# Patient Record
Sex: FEMALE | Race: WHITE | NOT HISPANIC OR LATINO | Employment: UNEMPLOYED | URBAN - METROPOLITAN AREA
[De-identification: names, ages, dates, MRNs, and addresses within clinical notes are randomized per-mention and may not be internally consistent; named-entity substitution may affect disease eponyms.]

---

## 2020-09-18 ENCOUNTER — APPOINTMENT (OUTPATIENT)
Dept: RADIOLOGY | Facility: MEDICAL CENTER | Age: 55
End: 2020-09-18
Attending: EMERGENCY MEDICINE
Payer: OTHER MISCELLANEOUS

## 2020-09-18 ENCOUNTER — HOSPITAL ENCOUNTER (OUTPATIENT)
Facility: MEDICAL CENTER | Age: 55
End: 2020-09-20
Attending: EMERGENCY MEDICINE | Admitting: HOSPITALIST
Payer: OTHER MISCELLANEOUS

## 2020-09-18 DIAGNOSIS — R56.9 SEIZURE-LIKE ACTIVITY (HCC): ICD-10-CM

## 2020-09-18 DIAGNOSIS — R55 SYNCOPE, UNSPECIFIED SYNCOPE TYPE: ICD-10-CM

## 2020-09-18 LAB
ALBUMIN SERPL BCP-MCNC: 4.2 G/DL (ref 3.2–4.9)
ALBUMIN/GLOB SERPL: 1.5 G/DL
ALP SERPL-CCNC: 41 U/L (ref 30–99)
ALT SERPL-CCNC: 31 U/L (ref 2–50)
ANION GAP SERPL CALC-SCNC: 15 MMOL/L (ref 7–16)
APTT PPP: 39.7 SEC (ref 24.7–36)
AST SERPL-CCNC: 31 U/L (ref 12–45)
BASOPHILS # BLD AUTO: 0.3 % (ref 0–1.8)
BASOPHILS # BLD: 0.03 K/UL (ref 0–0.12)
BILIRUB SERPL-MCNC: 0.2 MG/DL (ref 0.1–1.5)
BUN SERPL-MCNC: 14 MG/DL (ref 8–22)
CALCIUM SERPL-MCNC: 9.6 MG/DL (ref 8.5–10.5)
CHLORIDE SERPL-SCNC: 101 MMOL/L (ref 96–112)
CO2 SERPL-SCNC: 22 MMOL/L (ref 20–33)
CREAT SERPL-MCNC: 0.96 MG/DL (ref 0.5–1.4)
EKG IMPRESSION: NORMAL
EOSINOPHIL # BLD AUTO: 0.18 K/UL (ref 0–0.51)
EOSINOPHIL NFR BLD: 1.9 % (ref 0–6.9)
ERYTHROCYTE [DISTWIDTH] IN BLOOD BY AUTOMATED COUNT: 43.2 FL (ref 35.9–50)
GLOBULIN SER CALC-MCNC: 2.8 G/DL (ref 1.9–3.5)
GLUCOSE BLD-MCNC: 93 MG/DL (ref 65–99)
GLUCOSE SERPL-MCNC: 111 MG/DL (ref 65–99)
HCT VFR BLD AUTO: 35.8 % (ref 37–47)
HGB BLD-MCNC: 11.7 G/DL (ref 12–16)
IMM GRANULOCYTES # BLD AUTO: 0.06 K/UL (ref 0–0.11)
IMM GRANULOCYTES NFR BLD AUTO: 0.6 % (ref 0–0.9)
INR PPP: 2.47 (ref 0.87–1.13)
LYMPHOCYTES # BLD AUTO: 1.91 K/UL (ref 1–4.8)
LYMPHOCYTES NFR BLD: 20.3 % (ref 22–41)
MAGNESIUM SERPL-MCNC: 1.7 MG/DL (ref 1.5–2.5)
MCH RBC QN AUTO: 29.8 PG (ref 27–33)
MCHC RBC AUTO-ENTMCNC: 32.7 G/DL (ref 33.6–35)
MCV RBC AUTO: 91.1 FL (ref 81.4–97.8)
MONOCYTES # BLD AUTO: 0.51 K/UL (ref 0–0.85)
MONOCYTES NFR BLD AUTO: 5.4 % (ref 0–13.4)
NEUTROPHILS # BLD AUTO: 6.7 K/UL (ref 2–7.15)
NEUTROPHILS NFR BLD: 71.5 % (ref 44–72)
NRBC # BLD AUTO: 0 K/UL
NRBC BLD-RTO: 0 /100 WBC
PLATELET # BLD AUTO: 184 K/UL (ref 164–446)
PMV BLD AUTO: 11.3 FL (ref 9–12.9)
POTASSIUM SERPL-SCNC: 4.4 MMOL/L (ref 3.6–5.5)
PROT SERPL-MCNC: 7 G/DL (ref 6–8.2)
PROTHROMBIN TIME: 27.5 SEC (ref 12–14.6)
RBC # BLD AUTO: 3.93 M/UL (ref 4.2–5.4)
SODIUM SERPL-SCNC: 138 MMOL/L (ref 135–145)
TROPONIN T SERPL-MCNC: 10 NG/L (ref 6–19)
WBC # BLD AUTO: 9.4 K/UL (ref 4.8–10.8)

## 2020-09-18 PROCEDURE — 93005 ELECTROCARDIOGRAM TRACING: CPT | Performed by: EMERGENCY MEDICINE

## 2020-09-18 PROCEDURE — 85025 COMPLETE CBC W/AUTO DIFF WBC: CPT

## 2020-09-18 PROCEDURE — G0378 HOSPITAL OBSERVATION PER HR: HCPCS

## 2020-09-18 PROCEDURE — 80053 COMPREHEN METABOLIC PANEL: CPT

## 2020-09-18 PROCEDURE — 84484 ASSAY OF TROPONIN QUANT: CPT

## 2020-09-18 PROCEDURE — 85610 PROTHROMBIN TIME: CPT

## 2020-09-18 PROCEDURE — 93005 ELECTROCARDIOGRAM TRACING: CPT

## 2020-09-18 PROCEDURE — 83735 ASSAY OF MAGNESIUM: CPT

## 2020-09-18 PROCEDURE — 700102 HCHG RX REV CODE 250 W/ 637 OVERRIDE(OP): Performed by: STUDENT IN AN ORGANIZED HEALTH CARE EDUCATION/TRAINING PROGRAM

## 2020-09-18 PROCEDURE — 70450 CT HEAD/BRAIN W/O DYE: CPT

## 2020-09-18 PROCEDURE — A9270 NON-COVERED ITEM OR SERVICE: HCPCS | Performed by: STUDENT IN AN ORGANIZED HEALTH CARE EDUCATION/TRAINING PROGRAM

## 2020-09-18 PROCEDURE — 85730 THROMBOPLASTIN TIME PARTIAL: CPT

## 2020-09-18 PROCEDURE — 82962 GLUCOSE BLOOD TEST: CPT

## 2020-09-18 PROCEDURE — 700105 HCHG RX REV CODE 258: Performed by: STUDENT IN AN ORGANIZED HEALTH CARE EDUCATION/TRAINING PROGRAM

## 2020-09-18 PROCEDURE — 99285 EMERGENCY DEPT VISIT HI MDM: CPT

## 2020-09-18 RX ORDER — LISINOPRIL 5 MG/1
5 TABLET ORAL 2 TIMES DAILY
Status: DISCONTINUED | OUTPATIENT
Start: 2020-09-18 | End: 2020-09-20 | Stop reason: HOSPADM

## 2020-09-18 RX ORDER — OMEPRAZOLE 20 MG/1
20 CAPSULE, DELAYED RELEASE ORAL EVERY EVENING
Status: ON HOLD | COMMUNITY
End: 2020-09-20 | Stop reason: SDUPTHER

## 2020-09-18 RX ORDER — WARFARIN SODIUM 3 MG/1
3 TABLET ORAL
Status: DISCONTINUED | OUTPATIENT
Start: 2020-09-18 | End: 2020-09-20

## 2020-09-18 RX ORDER — POLYETHYLENE GLYCOL 3350 17 G/17G
1 POWDER, FOR SOLUTION ORAL
Status: DISCONTINUED | OUTPATIENT
Start: 2020-09-18 | End: 2020-09-20 | Stop reason: HOSPADM

## 2020-09-18 RX ORDER — SIMVASTATIN 40 MG
40 TABLET ORAL NIGHTLY
Status: ON HOLD | COMMUNITY
End: 2020-09-20 | Stop reason: SDUPTHER

## 2020-09-18 RX ORDER — LEVOTHYROXINE SODIUM 0.1 MG/1
200 TABLET ORAL EVERY MORNING
Status: DISCONTINUED | OUTPATIENT
Start: 2020-09-19 | End: 2020-09-20 | Stop reason: HOSPADM

## 2020-09-18 RX ORDER — CARVEDILOL 25 MG/1
50 TABLET ORAL 2 TIMES DAILY
Status: DISCONTINUED | OUTPATIENT
Start: 2020-09-18 | End: 2020-09-19

## 2020-09-18 RX ORDER — LISINOPRIL 5 MG/1
5 TABLET ORAL 2 TIMES DAILY
Status: ON HOLD | COMMUNITY
End: 2020-09-20 | Stop reason: SDUPTHER

## 2020-09-18 RX ORDER — WARFARIN SODIUM 2 MG/1
2 TABLET ORAL
Status: ON HOLD | COMMUNITY
End: 2020-09-20 | Stop reason: SDUPTHER

## 2020-09-18 RX ORDER — WARFARIN SODIUM 3 MG/1
3 TABLET ORAL
Status: ON HOLD | COMMUNITY
End: 2020-09-20 | Stop reason: SDUPTHER

## 2020-09-18 RX ORDER — NORTRIPTYLINE HYDROCHLORIDE 75 MG/1
150 CAPSULE ORAL NIGHTLY
Status: ON HOLD | COMMUNITY
End: 2020-09-20 | Stop reason: SDUPTHER

## 2020-09-18 RX ORDER — BUPROPION HYDROCHLORIDE 100 MG/1
100 TABLET, EXTENDED RELEASE ORAL EVERY MORNING
Status: ON HOLD | COMMUNITY
End: 2020-09-20

## 2020-09-18 RX ORDER — CARVEDILOL 25 MG/1
50 TABLET ORAL 2 TIMES DAILY
Status: ON HOLD | COMMUNITY
End: 2020-09-20

## 2020-09-18 RX ORDER — ACETAMINOPHEN 325 MG/1
650 TABLET ORAL EVERY 6 HOURS PRN
Status: DISCONTINUED | OUTPATIENT
Start: 2020-09-18 | End: 2020-09-20 | Stop reason: HOSPADM

## 2020-09-18 RX ORDER — DEXTROSE MONOHYDRATE 25 G/50ML
50 INJECTION, SOLUTION INTRAVENOUS
Status: DISCONTINUED | OUTPATIENT
Start: 2020-09-18 | End: 2020-09-19

## 2020-09-18 RX ORDER — LABETALOL HYDROCHLORIDE 5 MG/ML
10 INJECTION, SOLUTION INTRAVENOUS EVERY 4 HOURS PRN
Status: DISCONTINUED | OUTPATIENT
Start: 2020-09-18 | End: 2020-09-20 | Stop reason: HOSPADM

## 2020-09-18 RX ORDER — OMEPRAZOLE 20 MG/1
20 CAPSULE, DELAYED RELEASE ORAL EVERY EVENING
Status: DISCONTINUED | OUTPATIENT
Start: 2020-09-18 | End: 2020-09-20 | Stop reason: HOSPADM

## 2020-09-18 RX ORDER — BUSPIRONE HYDROCHLORIDE 10 MG/1
10 TABLET ORAL 3 TIMES DAILY
Status: ON HOLD | COMMUNITY
End: 2020-09-20

## 2020-09-18 RX ORDER — AMOXICILLIN 250 MG
2 CAPSULE ORAL 2 TIMES DAILY
Status: DISCONTINUED | OUTPATIENT
Start: 2020-09-18 | End: 2020-09-20 | Stop reason: HOSPADM

## 2020-09-18 RX ORDER — WARFARIN SODIUM 2 MG/1
2 TABLET ORAL
Status: DISCONTINUED | OUTPATIENT
Start: 2020-09-19 | End: 2020-09-20

## 2020-09-18 RX ORDER — BISACODYL 10 MG
10 SUPPOSITORY, RECTAL RECTAL
Status: DISCONTINUED | OUTPATIENT
Start: 2020-09-18 | End: 2020-09-20 | Stop reason: HOSPADM

## 2020-09-18 RX ORDER — SODIUM CHLORIDE, SODIUM LACTATE, POTASSIUM CHLORIDE, CALCIUM CHLORIDE 600; 310; 30; 20 MG/100ML; MG/100ML; MG/100ML; MG/100ML
INJECTION, SOLUTION INTRAVENOUS CONTINUOUS
Status: DISCONTINUED | OUTPATIENT
Start: 2020-09-18 | End: 2020-09-19

## 2020-09-18 RX ORDER — ARMODAFINIL 150 MG/1
150 TABLET ORAL EVERY EVENING
Status: ON HOLD | COMMUNITY
End: 2020-09-20

## 2020-09-18 RX ORDER — BUSPIRONE HYDROCHLORIDE 10 MG/1
10 TABLET ORAL 3 TIMES DAILY
Status: DISCONTINUED | OUTPATIENT
Start: 2020-09-18 | End: 2020-09-20 | Stop reason: HOSPADM

## 2020-09-18 RX ORDER — SIMVASTATIN 40 MG
40 TABLET ORAL NIGHTLY
Status: DISCONTINUED | OUTPATIENT
Start: 2020-09-18 | End: 2020-09-20 | Stop reason: HOSPADM

## 2020-09-18 RX ORDER — BUPROPION HYDROCHLORIDE 100 MG/1
100 TABLET, EXTENDED RELEASE ORAL EVERY MORNING
Status: DISCONTINUED | OUTPATIENT
Start: 2020-09-19 | End: 2020-09-19

## 2020-09-18 RX ORDER — LEVOTHYROXINE SODIUM 0.1 MG/1
200 TABLET ORAL EVERY MORNING
Status: ON HOLD | COMMUNITY
End: 2020-09-20 | Stop reason: SDUPTHER

## 2020-09-18 RX ADMIN — BUSPIRONE HYDROCHLORIDE 10 MG: 10 TABLET ORAL at 23:08

## 2020-09-18 RX ADMIN — SIMVASTATIN 40 MG: 40 TABLET, FILM COATED ORAL at 23:08

## 2020-09-18 RX ADMIN — CARVEDILOL 50 MG: 25 TABLET, FILM COATED ORAL at 23:08

## 2020-09-18 RX ADMIN — OMEPRAZOLE 20 MG: 20 CAPSULE, DELAYED RELEASE ORAL at 23:08

## 2020-09-18 RX ADMIN — SODIUM CHLORIDE, POTASSIUM CHLORIDE, SODIUM LACTATE AND CALCIUM CHLORIDE: 600; 310; 30; 20 INJECTION, SOLUTION INTRAVENOUS at 23:09

## 2020-09-18 RX ADMIN — LISINOPRIL 5 MG: 5 TABLET ORAL at 23:08

## 2020-09-18 ASSESSMENT — LIFESTYLE VARIABLES
CONSUMPTION TOTAL: NEGATIVE
DOES PATIENT WANT TO STOP DRINKING: NO
HAVE PEOPLE ANNOYED YOU BY CRITICIZING YOUR DRINKING: NO
EVER FELT BAD OR GUILTY ABOUT YOUR DRINKING: NO
ON A TYPICAL DAY WHEN YOU DRINK ALCOHOL HOW MANY DRINKS DO YOU HAVE: 0
TOTAL SCORE: 0
HOW MANY TIMES IN THE PAST YEAR HAVE YOU HAD 5 OR MORE DRINKS IN A DAY: 0
TOTAL SCORE: 0
ALCOHOL_USE: NO
EVER HAD A DRINK FIRST THING IN THE MORNING TO STEADY YOUR NERVES TO GET RID OF A HANGOVER: NO
AVERAGE NUMBER OF DAYS PER WEEK YOU HAVE A DRINK CONTAINING ALCOHOL: 0
HAVE YOU EVER FELT YOU SHOULD CUT DOWN ON YOUR DRINKING: NO
TOTAL SCORE: 0

## 2020-09-18 ASSESSMENT — CHA2DS2 SCORE
CHA2DS2 VASC SCORE: 4
VASCULAR DISEASE: YES
AGE 75 OR GREATER: NO
PRIOR STROKE OR TIA OR THROMBOEMBOLISM: NO
DIABETES: YES
AGE 65 TO 74: NO
CHF OR LEFT VENTRICULAR DYSFUNCTION: NO
SEX: FEMALE
HYPERTENSION: YES

## 2020-09-18 ASSESSMENT — COGNITIVE AND FUNCTIONAL STATUS - GENERAL
MOBILITY SCORE: 24
SUGGESTED CMS G CODE MODIFIER MOBILITY: CH
DAILY ACTIVITIY SCORE: 24
SUGGESTED CMS G CODE MODIFIER DAILY ACTIVITY: CH

## 2020-09-18 ASSESSMENT — FIBROSIS 4 INDEX: FIB4 SCORE: 1.63

## 2020-09-18 ASSESSMENT — PATIENT HEALTH QUESTIONNAIRE - PHQ9
2. FEELING DOWN, DEPRESSED, IRRITABLE, OR HOPELESS: NOT AT ALL
SUM OF ALL RESPONSES TO PHQ9 QUESTIONS 1 AND 2: 0
1. LITTLE INTEREST OR PLEASURE IN DOING THINGS: NOT AT ALL

## 2020-09-19 ENCOUNTER — APPOINTMENT (OUTPATIENT)
Dept: CARDIOLOGY | Facility: MEDICAL CENTER | Age: 55
End: 2020-09-19
Attending: STUDENT IN AN ORGANIZED HEALTH CARE EDUCATION/TRAINING PROGRAM
Payer: OTHER MISCELLANEOUS

## 2020-09-19 PROBLEM — I48.91 ATRIAL FIBRILLATION (HCC): Status: ACTIVE | Noted: 2020-09-19

## 2020-09-19 PROBLEM — I50.9 CHF (CONGESTIVE HEART FAILURE) (HCC): Status: ACTIVE | Noted: 2020-09-19

## 2020-09-19 PROBLEM — E11.9 DM2 (DIABETES MELLITUS, TYPE 2) (HCC): Status: ACTIVE | Noted: 2020-09-19

## 2020-09-19 PROBLEM — G47.33 OSA (OBSTRUCTIVE SLEEP APNEA): Status: ACTIVE | Noted: 2020-09-19

## 2020-09-19 PROBLEM — Z79.899 POLYPHARMACY: Status: ACTIVE | Noted: 2020-09-19

## 2020-09-19 PROBLEM — R40.20 LOSS OF CONSCIOUSNESS (HCC): Status: ACTIVE | Noted: 2020-09-19

## 2020-09-19 PROBLEM — E03.9 HYPOTHYROIDISM: Status: ACTIVE | Noted: 2020-09-19

## 2020-09-19 LAB
ALBUMIN SERPL BCP-MCNC: 4 G/DL (ref 3.2–4.9)
ALBUMIN/GLOB SERPL: 1.5 G/DL
ALP SERPL-CCNC: 40 U/L (ref 30–99)
ALT SERPL-CCNC: 28 U/L (ref 2–50)
AMPHET UR QL SCN: NEGATIVE
ANION GAP SERPL CALC-SCNC: 14 MMOL/L (ref 7–16)
APPEARANCE UR: CLEAR
AST SERPL-CCNC: 31 U/L (ref 12–45)
BARBITURATES UR QL SCN: NEGATIVE
BASOPHILS # BLD AUTO: 0.4 % (ref 0–1.8)
BASOPHILS # BLD: 0.04 K/UL (ref 0–0.12)
BENZODIAZ UR QL SCN: NEGATIVE
BILIRUB SERPL-MCNC: 0.3 MG/DL (ref 0.1–1.5)
BILIRUB UR QL STRIP.AUTO: NEGATIVE
BUN SERPL-MCNC: 13 MG/DL (ref 8–22)
BZE UR QL SCN: NEGATIVE
CALCIUM SERPL-MCNC: 9.3 MG/DL (ref 8.5–10.5)
CANNABINOIDS UR QL SCN: NEGATIVE
CHLORIDE SERPL-SCNC: 102 MMOL/L (ref 96–112)
CK SERPL-CCNC: 121 U/L (ref 0–154)
CO2 SERPL-SCNC: 21 MMOL/L (ref 20–33)
COLOR UR: YELLOW
CREAT SERPL-MCNC: 0.91 MG/DL (ref 0.5–1.4)
EOSINOPHIL # BLD AUTO: 0.18 K/UL (ref 0–0.51)
EOSINOPHIL NFR BLD: 2 % (ref 0–6.9)
ERYTHROCYTE [DISTWIDTH] IN BLOOD BY AUTOMATED COUNT: 44.7 FL (ref 35.9–50)
GLOBULIN SER CALC-MCNC: 2.7 G/DL (ref 1.9–3.5)
GLUCOSE BLD-MCNC: 100 MG/DL (ref 65–99)
GLUCOSE BLD-MCNC: 125 MG/DL (ref 65–99)
GLUCOSE BLD-MCNC: 91 MG/DL (ref 65–99)
GLUCOSE BLD-MCNC: 95 MG/DL (ref 65–99)
GLUCOSE SERPL-MCNC: 196 MG/DL (ref 65–99)
GLUCOSE UR STRIP.AUTO-MCNC: NEGATIVE MG/DL
HCT VFR BLD AUTO: 36.6 % (ref 37–47)
HGB BLD-MCNC: 11.7 G/DL (ref 12–16)
IMM GRANULOCYTES # BLD AUTO: 0.07 K/UL (ref 0–0.11)
IMM GRANULOCYTES NFR BLD AUTO: 0.8 % (ref 0–0.9)
KETONES UR STRIP.AUTO-MCNC: NEGATIVE MG/DL
LEUKOCYTE ESTERASE UR QL STRIP.AUTO: NEGATIVE
LV EJECT FRACT  99904: 55
LYMPHOCYTES # BLD AUTO: 2.69 K/UL (ref 1–4.8)
LYMPHOCYTES NFR BLD: 30 % (ref 22–41)
MCH RBC QN AUTO: 29.8 PG (ref 27–33)
MCHC RBC AUTO-ENTMCNC: 32 G/DL (ref 33.6–35)
MCV RBC AUTO: 93.1 FL (ref 81.4–97.8)
METHADONE UR QL SCN: NEGATIVE
MICRO URNS: NORMAL
MONOCYTES # BLD AUTO: 0.3 K/UL (ref 0–0.85)
MONOCYTES NFR BLD AUTO: 3.3 % (ref 0–13.4)
NEUTROPHILS # BLD AUTO: 5.68 K/UL (ref 2–7.15)
NEUTROPHILS NFR BLD: 63.5 % (ref 44–72)
NITRITE UR QL STRIP.AUTO: NEGATIVE
NRBC # BLD AUTO: 0 K/UL
NRBC BLD-RTO: 0 /100 WBC
OPIATES UR QL SCN: NEGATIVE
OXYCODONE UR QL SCN: NEGATIVE
PCP UR QL SCN: NEGATIVE
PH UR STRIP.AUTO: 7 [PH] (ref 5–8)
PLATELET # BLD AUTO: 193 K/UL (ref 164–446)
PMV BLD AUTO: 11.9 FL (ref 9–12.9)
POTASSIUM SERPL-SCNC: 3.5 MMOL/L (ref 3.6–5.5)
PROPOXYPH UR QL SCN: NEGATIVE
PROT SERPL-MCNC: 6.7 G/DL (ref 6–8.2)
PROT UR QL STRIP: NEGATIVE MG/DL
RBC # BLD AUTO: 3.93 M/UL (ref 4.2–5.4)
RBC UR QL AUTO: NEGATIVE
SODIUM SERPL-SCNC: 137 MMOL/L (ref 135–145)
SP GR UR STRIP.AUTO: 1.01
TROPONIN T SERPL-MCNC: 9 NG/L (ref 6–19)
TSH SERPL DL<=0.005 MIU/L-ACNC: 2.35 UIU/ML (ref 0.38–5.33)
UROBILINOGEN UR STRIP.AUTO-MCNC: 1 MG/DL
VIT B12 SERPL-MCNC: 1289 PG/ML (ref 211–911)
WBC # BLD AUTO: 9 K/UL (ref 4.8–10.8)

## 2020-09-19 PROCEDURE — 82607 VITAMIN B-12: CPT

## 2020-09-19 PROCEDURE — 700102 HCHG RX REV CODE 250 W/ 637 OVERRIDE(OP): Performed by: STUDENT IN AN ORGANIZED HEALTH CARE EDUCATION/TRAINING PROGRAM

## 2020-09-19 PROCEDURE — 80307 DRUG TEST PRSMV CHEM ANLYZR: CPT

## 2020-09-19 PROCEDURE — 94660 CPAP INITIATION&MGMT: CPT

## 2020-09-19 PROCEDURE — 81003 URINALYSIS AUTO W/O SCOPE: CPT

## 2020-09-19 PROCEDURE — 84484 ASSAY OF TROPONIN QUANT: CPT

## 2020-09-19 PROCEDURE — 36415 COLL VENOUS BLD VENIPUNCTURE: CPT

## 2020-09-19 PROCEDURE — 93306 TTE W/DOPPLER COMPLETE: CPT

## 2020-09-19 PROCEDURE — 85025 COMPLETE CBC W/AUTO DIFF WBC: CPT

## 2020-09-19 PROCEDURE — A9270 NON-COVERED ITEM OR SERVICE: HCPCS | Performed by: STUDENT IN AN ORGANIZED HEALTH CARE EDUCATION/TRAINING PROGRAM

## 2020-09-19 PROCEDURE — 99220 PR INITIAL OBSERVATION CARE,LEVL III: CPT | Mod: GC | Performed by: HOSPITALIST

## 2020-09-19 PROCEDURE — 84443 ASSAY THYROID STIM HORMONE: CPT

## 2020-09-19 PROCEDURE — 93306 TTE W/DOPPLER COMPLETE: CPT | Mod: 26 | Performed by: INTERNAL MEDICINE

## 2020-09-19 PROCEDURE — 82550 ASSAY OF CK (CPK): CPT

## 2020-09-19 PROCEDURE — G0378 HOSPITAL OBSERVATION PER HR: HCPCS

## 2020-09-19 PROCEDURE — 82962 GLUCOSE BLOOD TEST: CPT | Mod: 91

## 2020-09-19 PROCEDURE — 80053 COMPREHEN METABOLIC PANEL: CPT

## 2020-09-19 RX ORDER — SUMATRIPTAN 50 MG/1
100 TABLET, FILM COATED ORAL
Status: ON HOLD | COMMUNITY
End: 2020-09-20

## 2020-09-19 RX ORDER — POTASSIUM CHLORIDE 20 MEQ/1
40 TABLET, EXTENDED RELEASE ORAL ONCE
Status: COMPLETED | OUTPATIENT
Start: 2020-09-19 | End: 2020-09-19

## 2020-09-19 RX ORDER — BUTALBITAL, ACETAMINOPHEN AND CAFFEINE 50; 325; 40 MG/1; MG/1; MG/1
1-2 TABLET ORAL EVERY 4 HOURS PRN
Status: ON HOLD | COMMUNITY
End: 2020-09-20

## 2020-09-19 RX ORDER — OXYCODONE HYDROCHLORIDE 5 MG/1
5 TABLET ORAL EVERY 6 HOURS PRN
Status: ON HOLD | COMMUNITY
End: 2020-09-20

## 2020-09-19 RX ORDER — CARVEDILOL 25 MG/1
25 TABLET ORAL 2 TIMES DAILY
Status: DISCONTINUED | OUTPATIENT
Start: 2020-09-19 | End: 2020-09-20 | Stop reason: HOSPADM

## 2020-09-19 RX ORDER — HYDROCODONE BITARTRATE AND ACETAMINOPHEN 5; 325 MG/1; MG/1
1 TABLET ORAL EVERY 6 HOURS PRN
Status: ON HOLD | COMMUNITY
End: 2020-09-20

## 2020-09-19 RX ORDER — ONDANSETRON 8 MG/1
8 TABLET, ORALLY DISINTEGRATING ORAL EVERY 8 HOURS PRN
Status: ON HOLD | COMMUNITY
End: 2020-09-20

## 2020-09-19 RX ADMIN — CARVEDILOL 25 MG: 25 TABLET, FILM COATED ORAL at 18:15

## 2020-09-19 RX ADMIN — LISINOPRIL 5 MG: 5 TABLET ORAL at 04:44

## 2020-09-19 RX ADMIN — OMEPRAZOLE 20 MG: 20 CAPSULE, DELAYED RELEASE ORAL at 18:15

## 2020-09-19 RX ADMIN — LISINOPRIL 5 MG: 5 TABLET ORAL at 18:15

## 2020-09-19 RX ADMIN — CARVEDILOL 50 MG: 25 TABLET, FILM COATED ORAL at 04:43

## 2020-09-19 RX ADMIN — SIMVASTATIN 40 MG: 40 TABLET, FILM COATED ORAL at 20:07

## 2020-09-19 RX ADMIN — BUSPIRONE HYDROCHLORIDE 10 MG: 10 TABLET ORAL at 12:43

## 2020-09-19 RX ADMIN — POTASSIUM CHLORIDE 40 MEQ: 1500 TABLET, EXTENDED RELEASE ORAL at 08:13

## 2020-09-19 RX ADMIN — BUSPIRONE HYDROCHLORIDE 10 MG: 10 TABLET ORAL at 18:15

## 2020-09-19 RX ADMIN — WARFARIN SODIUM 3 MG: 3 TABLET ORAL at 00:33

## 2020-09-19 RX ADMIN — WARFARIN SODIUM 2 MG: 2 TABLET ORAL at 18:16

## 2020-09-19 RX ADMIN — LEVOTHYROXINE SODIUM 200 MCG: 0.1 TABLET ORAL at 04:44

## 2020-09-19 RX ADMIN — SERTRALINE HYDROCHLORIDE 50 MG: 50 TABLET ORAL at 04:44

## 2020-09-19 RX ADMIN — BUSPIRONE HYDROCHLORIDE 10 MG: 10 TABLET ORAL at 04:44

## 2020-09-19 ASSESSMENT — ENCOUNTER SYMPTOMS
MYALGIAS: 0
COUGH: 0
TREMORS: 1
DIAPHORESIS: 0
NAUSEA: 1
FEVER: 0
WEAKNESS: 0
ABDOMINAL PAIN: 0
DOUBLE VISION: 0
VOMITING: 0
TINGLING: 0
SORE THROAT: 0
CHILLS: 0
SHORTNESS OF BREATH: 0
NERVOUS/ANXIOUS: 1
DIARRHEA: 0
FOCAL WEAKNESS: 0
HEADACHES: 1
DEPRESSION: 0
CONSTIPATION: 0
PALPITATIONS: 0
BLOOD IN STOOL: 0
BLURRED VISION: 0

## 2020-09-19 ASSESSMENT — COPD QUESTIONNAIRES
COPD SCREENING SCORE: 1
DO YOU EVER COUGH UP ANY MUCUS OR PHLEGM?: NO/ONLY WITH OCCASIONAL COLDS OR INFECTIONS
DURING THE PAST 4 WEEKS HOW MUCH DID YOU FEEL SHORT OF BREATH: NONE/LITTLE OF THE TIME
HAVE YOU SMOKED AT LEAST 100 CIGARETTES IN YOUR ENTIRE LIFE: NO/DON'T KNOW

## 2020-09-19 ASSESSMENT — FIBROSIS 4 INDEX: FIB4 SCORE: 1.64

## 2020-09-19 NOTE — ASSESSMENT & PLAN NOTE
Reports history of YANIRA on CPAP with nightly compliance. Has equipment at hotel, but not at hospital.  -CPAP  -RT consult for CPAP management

## 2020-09-19 NOTE — PROGRESS NOTES
54yo female with syncope possible seizure. Hx of afib on warfarin.  Patient will be admitted by [facility] internal medicine discussed with Dr. Solano

## 2020-09-19 NOTE — ASSESSMENT & PLAN NOTE
PAF  -Chadsvasc 3    Plan:  -Coreg decreased to 25 mg BID, usual max dosing  -Warfarin, pharmacy dosing

## 2020-09-19 NOTE — PROGRESS NOTES
Assumed care of patient from ARIELLE Hernandez. Handoff report received and understood. Patient transported via Zoll monitor with ASHLEY RN to 807-1. Patient in bed, alert and oriented x4. Bed is low and locked. Call light within reach. No further needs at this time.

## 2020-09-19 NOTE — SENIOR ADMIT NOTE
Senior Admit Note    Patient: Elise Rodriguez.  MRN: [id number].                               Chief complaint: syncope. ?Seizure    Assessment and Plan:  This is a 55 yr old female brought to ED by EMS after what seems to be a seizure like activity (tonic clonic seizures) and was in post ictal confusion. But, according to patient and her  bedside who has a second hand story, patient was sitting at a slot machine table, lost consciousness and fell flat on table, no rhythmic jerks or tongue bite or loss of bladder or bowel control but mentioned confusion after the event but also mentioned that she has some short term memory loss after her fall 7 yrs ago when he had a fall and hit her head. The history is inconclusive whether it is true seizure episode or syncope.  Patients medical history is also significant for CHF, Afib, sleep apnea on CPAP, Sarcoidosis, [id number].  Pt receives care at [facility] in [place], [place] and is visiting [place].    #Syncope  #? Seizure  #CHF  #[id number]  #Afib (likely paroxysmal)  #Memory issues  #Sleep apnea on CPAP    -Based on presentation this likely could be a syncope vs ? Seizure, patient has a h/o of CHF (not clear if it is HFrEF or Diastolic heart failure), and sleep apnea could be contributing to her Diastolic heart failure and causing her syncope, patient doesn't have any past history of seizures, no electrolyte abnormalities, however mentioned she had a fall 7 yr ago and hit her head (doesn't remember exact events) could be a focii for seizure.    -Orthostatic vitals pending  -IV fluids, CPK pending  -DC Bupropion as it has the potential to lower seizure threshold  -Echo pending to assess cardiac function  -No conduction/heart blocks appreciated on EKG, patient in sinus rhythm now, troponin- wnl,  -continue carvedilol, lisinopril, warfarin.  -Telemetry monitoring  -If any further episodes of Seizure, we will consider loading with Keppra and encourage day team to consult  Neurology for potential EEG.    DVT prophylaxis: Lovenox  Code status: Full code    For complete details, please refer to H&P by Dr. Plasencia.    Elier Solano MD  Resident Physician, PGY2  Internal Medicine, [employer].

## 2020-09-19 NOTE — ED NOTES
"Pt brought in by KATH[facility] from [facility], per EMS pt had witness \"tonic clonic\" seizure that lasted for approx. 30 seconds to 1 minute. No hx of seizures. EMS states on arrival to scene pt sitting up in chair, post ictal. States during transport pt became more alert and answering questions appropriately.     . On arrival to room pt alert/oriented x 4, knows year but confused to what month it is. Pt denies any pain.    Pt admits to two alcoholic drinks today, states drinking very rare.   "

## 2020-09-19 NOTE — ASSESSMENT & PLAN NOTE
History of hypothyroidism, unknown etiology, as reported by patient. Ordinarily on synthroid 200mcg at home.  -Resume synthroid 200mcg

## 2020-09-19 NOTE — ASSESSMENT & PLAN NOTE
Acute LOC 30s-1min with post-ictal confusion  -hx A fib, HFrEF, polypharmacy but no seizures or prior syncope  -2/2 syncope most likely cardiogenic from arrhythmia in setting of polypharmacy difficult to exclude neurogenic/seizure given poor witness accounts  -Echo without structural abnormality    Plan:  -telemetry  -holding buproprion (lowers seizure threshold)  -if seizure workup indicated, then will leave instructions for followup in [place] and suspend license

## 2020-09-19 NOTE — PROGRESS NOTES
Inpatient Anticoagulation Service Note    Date: 9/18/2020    Reason for Anticoagulation: Atrial Fibrillation   Target INR: 2.0 to 3.0  [id number] VASc Score: 4  HAS-BLED Score: 0   Hemoglobin Value: (!) 11.7  Hematocrit Value: (!) 35.8  Lab Platelet Value: 184    INR from last 7 days     Date/Time INR Value    09/18/20 1852  (!) 2.47        Dose from last 7 days     None        Bridge Therapy: No     Reversal Agent Administered: Not Applicable    Comments: Contiue home warfarin for a-fib, home dosing regimen is 3 mg on M, W, F and 2 mg AOD. INR on admit is therapeutic at 2.47. Will continue with home dosing regimen and order INR x3 days.    Plan:  Continue home dosing regimen of 3 mg on M,W,F and 2 mg AOD.    Education Material Provided?: No (chronic warfarin patient)    Pharmacist suggested discharge dosing: Resume home dosing regimen of 3 mg on M,W,F and 2 mg AOD with close follow-up with anticoagulation clinic within 72 hours of discharge.     Mayi Calderon, PharmD

## 2020-09-19 NOTE — H&P
"History & Physical Note    Date of Admission: 9/18/2020  Admission Status: Observation-Outpatient  [facility] Team: [facility] IM Red Team   Attending: Amira Jones MD   Senior Resident: Dr. Wisam Calvin DO  Contact Number: [phone]    Chief Complaint: \"they told me I slumped over.\"    History of Present Illness (HPI):   Elise is a 55 y.o. female with hx including atrial fibrillation, CHF, DM2 who presented 9/18/2020 with sudden onset LoC/confusion. This afternoon, she was in casino at a machine and suddenly lost consciousness. Per bystander, patient slumped back into chair, then leaned forward falling onto machine, and then rolled to the floor. Spouse, nearby, did not witness event, and was unsure of rhythmic movements; per EMS note, possible tonic-clonic seizure lasting 30s-1min.  No incontinence, but post episodic confusion, tremulousness, and anterograde amnesia/short term memory difficulty. Prior to event, patient does report having 1/2 beer but stopped due to queasiness/disliking. Has been having worsening difficulty with memory, which has been present for long time.    Originally from [place], [place]. Gets care at [facility]. Reports history of Atrial fibrillation, CHF which were diagnosed simultaneously approximately 6-7 years ago, were unsure of her EF %.    Review of Systems:   Review of Systems   Constitutional: Negative for chills, diaphoresis, fever and malaise/fatigue.   HENT: Positive for tinnitus. Negative for congestion, hearing loss, nosebleeds and sore throat.    Eyes: Negative for blurred vision and double vision.   Respiratory: Negative for cough and shortness of breath.    Cardiovascular: Negative for chest pain, palpitations and leg swelling.   Gastrointestinal: Positive for nausea. Negative for abdominal pain, blood in stool, constipation, diarrhea and vomiting.   Genitourinary: Negative for dysuria, frequency, hematuria and urgency.   Musculoskeletal: Negative for joint pain and myalgias.   Skin: " Negative for itching and rash.   Neurological: Positive for tremors and headaches. Negative for tingling, focal weakness and weakness.   Psychiatric/Behavioral: Negative for depression. The patient is nervous/anxious.          Past Medical History:   Past Medical History was reviewed with patient.  Additional information: Atrial fibrillation, CHF, migraine HA, YANIRA compliant with CPAP, sarcoidosis (remission), neuropathy.   has a past medical history of Diabetes (HCC), High cholesterol, and Hypertension.    Past Surgical History: Past Surgical History was reviewed with patient.  Additional information: cholecystectomy   has no past surgical history on file.    Medications: Medications have been reviewed with patient.  Prior to Admission Medications   Prescriptions Last Dose Informant Patient Reported? Taking?   Armodafinil 150 MG Tab 9/17/2020 at pm Rx Bottle (For Med Information) Yes Yes   Sig: Take 150 mg by mouth every evening.   CALCIUM PO 9/18/2020 at 0830 Rx Bottle (For Med Information) Yes Yes   Sig: Take 1 Tab by mouth 2 Times a Day.   MAGNESIUM PO 9/18/2020 at 0830 Rx Bottle (For Med Information) Yes Yes   Sig: Take 1 Tab by mouth 2 Times a Day.   buPROPion SR (WELLBUTRIN-SR) 100 MG TABLET SR 12 HR 9/18/2020 at 0830 Rx Bottle (For Med Information) Yes Yes   Sig: Take 100 mg by mouth every morning.   busPIRone (BUSPAR) 10 MG Tab tablet 9/18/2020 at afternoon Rx Bottle (For Med Information) Yes Yes   Sig: Take 10 mg by mouth 3 times a day.   carvedilol (COREG) 25 MG Tab 9/18/2020 at 0830 Rx Bottle (For Med Information) Yes Yes   Sig: Take 50 mg by mouth 2 Times a Day.   levothyroxine (SYNTHROID) 100 MCG Tab 9/18/2020 at 0830 Rx Bottle (For Med Information) Yes Yes   Sig: Take 200 mcg by mouth every morning.   lisinopril (PRINIVIL) 5 MG Tab 9/18/2020 at 0830 Rx Bottle (For Med Information) Yes Yes   Sig: Take 5 mg by mouth 2 Times a Day.   metFORMIN (GLUCOPHAGE) 500 MG Tab 9/18/2020 at afternoon Rx Bottle (For  Med Information) Yes Yes   Sig: Take 500 mg by mouth 3 times a day.   nortriptyline (PAMELOR) 75 MG capsule 9/17/2020 at pm Rx Bottle (For Med Information) Yes Yes   Sig: Take 150 mg by mouth every evening.   omeprazole (PRILOSEC) 20 MG delayed-release capsule 9/17/2020 at pm Rx Bottle (For Med Information) Yes Yes   Sig: Take 20 mg by mouth every evening.   sertraline (ZOLOFT) 50 MG Tab 9/18/2020 at 0830 Rx Bottle (For Med Information) Yes Yes   Sig: Take 50 mg by mouth every morning.   simvastatin (ZOCOR) 40 MG Tab 9/17/2020 at pm Rx Bottle (For Med Information) Yes Yes   Sig: Take 40 mg by mouth every evening.   warfarin (COUMADIN) 2 MG Tab 9/17/2020 at pm Rx Bottle (For Med Information) Yes Yes   Sig: Take 2 mg by mouth every 48 hours. Tuesday, Thursday, Saturday & Sunday   warfarin (COUMADIN) 3 MG Tab 9/16/2020 at pm Rx Bottle (For Med Information) Yes Yes   Sig: Take 3 mg by mouth 3 times a week. Monday, Wednesday, & Friday      Facility-Administered Medications: None        Allergies: Allergies have been reviewed with patient.  No Known Allergies    Family History: Additional information as follows:  Mother: lung CA; Father: prostate CA  family history is not on file.     Social History:   Tobacco: 1/2ppd for estimated 25 yrs, quit 7 years prior   Alcohol: Rare, last drink tonight prior to event; estimated 6 month abstinence prior   Recreational drugs (illegal and prescription):  never   Employment:   Activity Level: household activities   Living situation:  2 story home in [place], [place]; 2 steps into house, 16 up to bedroom; lives with spouse.  Recent travel:  [facility]; [employer] 6 months prior.  Primary Care Provider: reviewed non-specified PCP at [facility].  Other (stressors, spirituality, exposures):    Physical Exam:   Vitals:  Temp:  [36.1 °C (97 °F)] 36.1 °C (97 °F)  Pulse:  [83-96] 83  Resp:  [16-18] 17  BP: (138-176)/(68-92) 152/92  SpO2:  [94 %-97 %] 94 %    Physical Exam  Vitals signs and  nursing note reviewed.   Constitutional:       General: She is not in acute distress.     Appearance: She is not ill-appearing, toxic-appearing or diaphoretic.   HENT:      Head: Normocephalic and atraumatic.      Mouth/Throat:      Mouth: Mucous membranes are moist.      Pharynx: Oropharynx is clear.   Eyes:      Extraocular Movements: Extraocular movements intact.      Pupils: Pupils are equal, round, and reactive to light.   Neck:      Musculoskeletal: Normal range of motion.   Cardiovascular:      Rate and Rhythm: Normal rate and regular rhythm.      Heart sounds: No murmur. No friction rub. No gallop.    Pulmonary:      Effort: Pulmonary effort is normal. No respiratory distress.      Breath sounds: Normal breath sounds. No wheezing or rales.   Abdominal:      General: There is no distension.      Palpations: Abdomen is soft.      Tenderness: There is no abdominal tenderness. There is no guarding or rebound.   Musculoskeletal: Normal range of motion.      Comments: Shoulder, elbow flexion/extension 5/5 bilaterally. No limits to ROM of hip, knee. Hip flexion 5/5 bilaterally. Dorsi/plantarflexion 5/5 bilaterally.   Skin:     General: Skin is warm and dry.   Neurological:      Mental Status: She is alert.      Cranial Nerves: Cranial nerves are intact.      Motor: Motor function is intact.      Comments: CN 2-12 intact w/o deficit or weakness. Sensation to pressure, touch, pinprick, and proprioception intact bilaterally in upper and lower extremities. No coordination deficit. Muscle strengths intact and as noted in MSK. Normal gait. No vertical/horizontal nystagmus. Memory deficit, but no obvious confusion--interacts and responds purposefully and directly, without tangential thoughts. No comprehension/speaking deficits.         Labs:   Lab Results   Component Value Date/Time    SODIUM 137 09/19/2020 01:10 AM    POTASSIUM 3.5 (L) 09/19/2020 01:10 AM    CHLORIDE 102 09/19/2020 01:10 AM    CO2 21 09/19/2020 01:10 AM     GLUCOSE 196 (H) 09/19/2020 01:10 AM    BUN 13 09/19/2020 01:10 AM    CREATININE 0.91 09/19/2020 01:10 AM      Recent Labs     09/18/20  1852 09/19/20  0110   WBC 9.4 9.0   RBC 3.93* 3.93*   HEMOGLOBIN 11.7* 11.7*   HEMATOCRIT 35.8* 36.6*   MCV 91.1 93.1   MCH 29.8 29.8   RDW 43.2 44.7   PLATELETCT 184 193   MPV 11.3 11.9   NEUTSPOLYS 71.50 63.50   LYMPHOCYTES 20.30* 30.00   MONOCYTES 5.40 3.30   EOSINOPHILS 1.90 2.00   BASOPHILS 0.30 0.40     Troponin: 10  INR: 2.47  TSH: 2.35      EKG: Per my read, sinus rhythm, bpm 90, small ST depression indicative of possible strain.    Imaging:   CT head 9/18:  1.  No intracranial abnormality.  2.  Mild bilateral maxillary sinus disease.    Previous Data Review: reviewed, requested records from [facility]    Problem Representation:     Elise Rodriguez is a 56yo F with hx of Atrial fibrillation, CHF, YANIRA, DM2, presenting for witnessed, acute, brief LoC with post-event confusion/memory difficulty; undergoing workup for syncopal episode with cardiac history and new onset seizure.     Loss of consciousness (HCC)  Assessment & Plan  Acute onset, single episode of loss of consciousness lasting estimated 30s-1min, without incontinence but with post event confusion. Has a significant cardiac history including atrial fibrillation and CHF, but appears to be well rate controlled; unsure EF%. At presentation, had normal BG, normal electrolytes, afebrile, no neurological deficits aside from short term memory difficulty. Head CT negative for intracranial process. BP was mildly elevated, with reports of normal/good oral intake. Most likely etiology is cardiac in nature such as atrial fibrillation or CHF, orthostatic hypotension, thromboembolic event, hypoxia. May also be neurologic: paroxysmal seizure, tonic-clonic seizure, febrile seizure, brief epileptic episode.  -Orthostatic BP  -ECHO  -TSH/T4 reflex, pending  -holding buproprion (lowers seizure threshold)  -repeat BMP    CHF  (congestive heart failure) ([facility])  Assessment & Plan  History of 7 year HF, unknown EF%, reported as diagnosed and followed at [facility] in [facility]. No lower extremity edema, lung base crackles/rales, hypoxia, SOB or chest pain. Suspected to be diastolic with preserved function. Possibly due to pulmonary strain secondary to YANIRA, or an unreported thromboemoblic event, or atrial fibrillation. May be possible contributor to loss of consciousness. Reports regularly taking medications.  -resume coreg 50mg PO BID  -resume lisinopril 5mg PO qD  -resume simvastatin 40mg PO qD  -ECHO    Atrial fibrillation ([facility])  Assessment & Plan  History of atrial fibrillation, onset approximately 7 years ago. Rate appears well controlled with coreg, anticoagulation with warfarin, INR 2.47.  -continue home Coreg 50mg PO BID  -Warfarin, pharmacy dosing      Hypothyroidism  Assessment & Plan  History of hypothyroidism, unknown etiology, as reported by patient. Ordinarily on synthroid 200mcg at home.  -Resume synthroid 200mcg  -TSH/T4 check    DM2 (diabetes mellitus, type 2) ([facility])  Assessment & Plan  History of DM2 controlled with metformin. Admission , suggestive of good glycemic control. Does have lower extremity neuropathy, reports being previously on gabapentin, but may have concurrent coverage with nortriptyline (migraine).  -hold metformin for admission  -SSI, low correction    YANIRA (obstructive sleep apnea)  Assessment & Plan  Reports history of YANIRA on CPAP with nightly compliance. Has equipment at hotel, but not at hospital. Current saturation 97%, but this is not indicative of overnight saturations.  -CPAP  -RT consult for CPAP management

## 2020-09-19 NOTE — ED PROVIDER NOTES
"ED Provider Note    CHIEF COMPLAINT  Chief Complaint   Patient presents with   • Seizure       HPI  Angie Rodriguez is a 55 y.o. female who presents after suspected seizure-like activity.  [place] and her  was in the hotel room.  She states that she last remembers being on the casino floor and has no recollection for several minutes after that.  She awoke on an ambulance.  She denies any symptoms at this time.  No neck pain, headache, change in vision.  No recent fever or illness.  No recent nausea or vomiting or diarrhea.  No upper or lower extremity numbness or weakness.  No history of tongue biting or incontinence.  No prior history of seizure.  No significant family history of neurologic abnormalities such as seizures or brain masses.  She does have a known history of A. fib and is on Coumadin.  Denies direct head trauma that she remembers.    REVIEW OF SYSTEMS  See HPI for further details. All other systems are negative.     PAST MEDICAL HISTORY   has a past medical history of Diabetes (HCC), High cholesterol, and Hypertension.    SOCIAL HISTORY  Social History     Tobacco Use   • Smoking status: Never Smoker   • Smokeless tobacco: Never Used   Substance and Sexual Activity   • Alcohol use: Yes     Comment: occ   • Drug use: Not Currently   • Sexual activity: Not on file       SURGICAL HISTORY  patient denies any surgical history    CURRENT MEDICATIONS  Home Medications     Reviewed by Ruby Muir R.N. (Registered Nurse) on 09/18/20 at 1808  Med List Status: Partial   Medication Last Dose Status   ASPIRIN 81 PO  Active   Atorvastatin Calcium (LIPITOR PO)  Active   GLYBURIDE PO  Active   Losartan Potassium (COZAAR PO)  Active   METFORMIN HCL PO  Active   OMEPRAZOLE PO  Active   TERAZOSIN HCL PO  Active                ALLERGIES  No Known Allergies    PHYSICAL EXAM  VITAL SIGNS: BP (!) 176/91   Pulse 96   Temp 36.1 °C (97 °F) (Temporal)   Resp 18   Ht 1.702 m (5' 7\")   " Wt 108.9 kg (240 lb)   SpO2 97%   BMI 37.59 kg/m²   Pulse ox interpretation: I interpret this pulse ox as normal.  Constitutional: Alert in no apparent distress.  HENT: No signs of trauma, Bilateral external ears normal, Nose normal.   Eyes: Pupils are equal and reactive, Conjunctiva normal, Non-icteric.   Neck: Normal range of motion, No tenderness, Supple, No stridor.   Cardiovascular: Regular rate and rhythm.   Thorax & Lungs: Normal breath sounds, No respiratory distress, No wheezing, No chest tenderness.   Abdomen: Bowel sounds normal, Soft, No tenderness, No masses, No pulsatile masses. No peritoneal signs.  Skin: Warm, Dry, No erythema, No rash.   Back: No bony tenderness, No CVA tenderness.   Extremities: Intact distal pulses, No edema, No tenderness, No cyanosis  Musculoskeletal: Good range of motion in all major joints. No tenderness to palpation or major deformities noted.   Neurologic: Alert, oriented to person and place and situation, not oriented to time, normal motor function and gait, Normal sensory function, No focal deficits noted.       DIAGNOSTIC STUDIES / PROCEDURES    EKG - Physician interpretation  Results for orders placed or performed during the hospital encounter of 20   EKG   Result Value Ref Range    Report       [facility] Emergency Dept.    Test Date:  2020  Pt Name:    ROBERTA GUY               Department: ER  MRN:        [id number]                      Room:        19  Gender:     Female                       Technician: [id number]  :        1965                   Requested By:ER TRIAGE PROTOCOL  Order #:    [id number]                    Reading MD: ROSANNA ROBLES MD    Measurements  Intervals                                Axis  Rate:       92                           P:          59  MA:         192                          QRS:        8  QRSD:       116                          T:          -61  QT:         380  QTc:        471    Interpretive  Statements  SINUS RHYTHM  CONSIDER LEFT ATRIAL ABNORMALITY  NONSPECIFIC INTRAVENTRICULAR CONDUCTION DELAY  PROBABLE LVH WITH SECONDARY REPOL ABNRM  No previous ECG available for comparison  Electronically Signed On 9- 18:44:04 PDT by ROSANNA ROBLES MD           LABS  Labs Reviewed   CBC WITH DIFFERENTIAL - Abnormal; Notable for the following components:       Result Value    RBC 3.93 (*)     Hemoglobin 11.7 (*)     Hematocrit 35.8 (*)     MCHC 32.7 (*)     Lymphocytes 20.30 (*)     All other components within normal limits    Narrative:     Indicate which anticoagulants the patient is on:->COUMADIN   COMP METABOLIC PANEL - Abnormal; Notable for the following components:    Glucose 111 (*)     All other components within normal limits    Narrative:     Indicate which anticoagulants the patient is on:->COUMADIN   PROTHROMBIN TIME - Abnormal; Notable for the following components:    PT 27.5 (*)     INR 2.47 (*)     All other components within normal limits    Narrative:     Indicate which anticoagulants the patient is on:->COUMADIN   APTT - Abnormal; Notable for the following components:    APTT 39.7 (*)     All other components within normal limits    Narrative:     Indicate which anticoagulants the patient is on:->COUMADIN   COMP METABOLIC PANEL - Abnormal; Notable for the following components:    Potassium 3.5 (*)     Glucose 196 (*)     All other components within normal limits   CBC WITH DIFFERENTIAL - Abnormal; Notable for the following components:    RBC 3.93 (*)     Hemoglobin 11.7 (*)     Hematocrit 36.6 (*)     MCHC 32.0 (*)     All other components within normal limits   VITAMIN B12 - Abnormal; Notable for the following components:    Vitamin B12 -True Cobalamin 1289 (*)     All other components within normal limits   TROPONIN    Narrative:     Indicate which anticoagulants the patient is on:->COUMADIN   MAGNESIUM    Narrative:     Indicate which anticoagulants the patient is on:->COUMADIN   ESTIMATED  GFR    Narrative:     Indicate which anticoagulants the patient is on:->COUMADIN   TSH WITH REFLEX TO FT4   CREATINE KINASE   ESTIMATED GFR   ACCU-CHEK GLUCOSE         RADIOLOGY  CT-HEAD W/O   Final Result      1.  No intracranial abnormality.   2.  Mild bilateral maxillary sinus disease.               INTERPRETING LOCATION:  [facility], [place], [address]      EC-ECHOCARDIOGRAM COMPLETE W/ CONT    (Results Pending)         COURSE & MEDICAL DECISION MAKING    Medications   senna-docusate (PERICOLACE or SENOKOT S) 8.6-50 MG per tablet 2 Tab (2 Tabs Oral Refused 9/19/20 0600)     And   polyethylene glycol/lytes (MIRALAX) PACKET 1 Packet (has no administration in time range)     And   magnesium hydroxide (MILK OF MAGNESIA) suspension 30 mL (has no administration in time range)     And   bisacodyl (DULCOLAX) suppository 10 mg (has no administration in time range)   Respiratory Therapy Consult (has no administration in time range)   acetaminophen (TYLENOL) tablet 650 mg (has no administration in time range)   labetalol (NORMODYNE/TRANDATE) injection 10 mg (has no administration in time range)   insulin regular (HumuLIN R,NovoLIN R) injection (has no administration in time range)     And   glucose 4 g chewable tablet 16 g (has no administration in time range)     And   dextrose 50% (D50W) injection 50 mL (has no administration in time range)   busPIRone (BUSPAR) tablet 10 mg (10 mg Oral Given 9/19/20 0444)   carvedilol (COREG) tablet 50 mg (50 mg Oral Given 9/19/20 0443)   levothyroxine (SYNTHROID) tablet 200 mcg (200 mcg Oral Given 9/19/20 0444)   lisinopril (PRINIVIL) tablet 5 mg (5 mg Oral Given 9/19/20 0444)   omeprazole (PRILOSEC) capsule 20 mg (20 mg Oral Given 9/18/20 2308)   sertraline (ZOLOFT) tablet 50 mg (50 mg Oral Given 9/19/20 0444)   simvastatin (ZOCOR) tablet 40 mg (40 mg Oral Given 9/18/20 2308)   MD Alert...Warfarin per Pharmacy (has no administration in time range)   warfarin (COUMADIN) tablet 3 mg (3 mg  Oral Given 9/19/20 0033)     And   warfarin (COUMADIN) tablet 2 mg (has no administration in time range)       Pertinent Labs & Imaging studies reviewed. (See chart for details)  55 y.o. female presenting with syncope versus seizure.  She does not recall the event.  She [place] a [place] the [place].  Her  is at bedside and however he was not present when the patient had the syncopal versus seizure event.  No prior neurologic illness.  No headaches.  Based on EMS reporting, it appears that she may have had a postictal state.  No tongue biting or incontinence.  She does have a prior history of syncope in the past.  She states that it was because of heart failure and required a prolonged ICU stay several years ago.  Denies any trouble breathing or chest pain.  Clear breath sounds bilaterally.  No fever or recent illness.  No vomiting.  No residual headache, change in vision, neurologic deficit.    Cannot fully discern whether or not the patient had seizure or syncopal event.  EKG is rather abnormal with diffuse ST depressions with ST elevation in aVR.  This is nonspecific.  No prior EKG for comparison.  Troponin is negative.  Again, the patient does not have chest pain or trouble breathing.    CT of the head is unremarkable.  No intracranial mass.  No intracranial bleeding.  This appears to be a potential first-time seizure.  Patient is noted to have some memory issues according to her  that are baseline.  No acute neurologic deficits are identified today.    Due to the patient's complicated prior medical history and unclear history with regards to the events from earlier today, recommending observation.  Patient intends to have her  drive her back home to [place] where they live in the next 2 days.  I am concerned with him traveling through very remote regions without further observation or work-up.  They are agreeable to the hospitalization.  Spoke to the hospitalist who is agreeable to the  "hospitalization.      /68   Pulse 78   Temp 36.3 °C (97.3 °F) (Temporal)   Resp 18   Ht 1.702 m (5' 7\")   Wt 108.8 kg (239 lb 13.8 oz)   SpO2 93%   BMI 37.57 kg/m²     The patient was referred to primary care where they will receive further BP management.      No follow-up provider specified.    FINAL IMPRESSION  1. Syncope, unspecified syncope type    2. Seizure-like activity (HCC)            Electronically signed by: Will Valadez M.D., 9/18/2020 6:30 PM    "

## 2020-09-19 NOTE — ED NOTES
Pharmacy Medication Reconciliation      Medication reconciliation updated and complete per pt at bedside with medication at bedside  Allergies have been verified   No oral ABX within the last 14 days  Pt home pharmacy:[facility]

## 2020-09-19 NOTE — ASSESSMENT & PLAN NOTE
Previous HFrEF unknown cause  -now no evidence of heart dysfunction on BB/ACE without structural abnormality    Plan:  -continue ace, bb, statin

## 2020-09-19 NOTE — PROGRESS NOTES
Daily Progress Note:     Date of Service: 9/19/2020  Primary Team: [facility] IM Red Team    Attending: Amira Jones M.D.   Senior Resident: Wisam Calvin D.O.  Contact:  [phone]    Chief Complaint:   Loss of consciousness     ID:  Patient is a 56 yo F from [facility] here with LOC 9/18 of unknown cause  -likely syncopal event from arrhythmia  -Echo vastly improved from 2014 echo at care everywhere with 55% EF and no structural issue    Subjective:   -No acute events overnight    -Patient woke up tremulous, this improved throughout day. No difficulty quickly getting up, active and feeling well with mentation at baseline.    -Dispo: Discussed with patient and  that we will need to completely review patient's medications and telemetry before discharge, if unable to exclude seizure then needs outpt followup    Consultants/Specialty:  N/A    Review of Systems:    Review of Systems   Constitutional: Negative for chills, diaphoresis, fever and malaise/fatigue.   HENT: Positive for tinnitus. Negative for congestion, hearing loss, nosebleeds and sore throat.    Eyes: Negative for blurred vision and double vision.   Respiratory: Negative for cough and shortness of breath.    Cardiovascular: Negative for chest pain, palpitations and leg swelling.   Gastrointestinal: Positive for nausea. Negative for abdominal pain, blood in stool, constipation, diarrhea and vomiting.   Genitourinary: Negative for dysuria, frequency, hematuria and urgency.   Musculoskeletal: Negative for joint pain and myalgias.   Skin: Negative for itching and rash.   Neurological: Positive for tremors and headaches. Negative for tingling, focal weakness and weakness.   Psychiatric/Behavioral: Negative for depression. The patient is nervous/anxious.        Objective:   Physical Exam:   Vitals:   Temp:  [36.1 °C (97 °F)-36.3 °C (97.3 °F)] 36.2 °C (97.1 °F)  Pulse:  [70-96] 70  Resp:  [16-18] 16  BP: (102-176)/(68-92) 132/86  SpO2:  [90 %-97 %] 96  %    Physical Exam  Vitals signs and nursing note reviewed.   Constitutional:       General: She is not in acute distress.     Appearance: She is not ill-appearing, toxic-appearing or diaphoretic.   HENT:      Head: Normocephalic and atraumatic.      Mouth/Throat:      Mouth: Mucous membranes are moist.      Pharynx: Oropharynx is clear.   Eyes:      Extraocular Movements: Extraocular movements intact.      Pupils: Pupils are equal, round, and reactive to light.   Neck:      Musculoskeletal: Normal range of motion.   Cardiovascular:      Rate and Rhythm: Normal rate and regular rhythm.      Heart sounds: No murmur. No friction rub. No gallop.    Pulmonary:      Effort: Pulmonary effort is normal. No respiratory distress.      Breath sounds: Normal breath sounds. No wheezing or rales.   Abdominal:      General: There is no distension.      Palpations: Abdomen is soft.      Tenderness: There is no abdominal tenderness. There is no guarding or rebound.   Musculoskeletal: Normal range of motion.      Comments: Shoulder, elbow flexion/extension 5/5 bilaterally. No limits to ROM of hip, knee. Hip flexion 5/5 bilaterally. Dorsi/plantarflexion 5/5 bilaterally.   Skin:     General: Skin is warm and dry.   Neurological:      Mental Status: She is alert.      Cranial Nerves: Cranial nerves are intact.      Motor: Motor function is intact.      Comments: CN 2-12 intact w/o deficit or weakness. Sensation to pressure, touch, pinprick, and proprioception intact bilaterally in upper and lower extremities. No coordination deficit. Muscle strengths intact and as noted in MSK. Normal gait. No vertical/horizontal nystagmus. Memory deficit, but no obvious confusion--interacts and responds purposefully and directly, without tangential thoughts. No comprehension/speaking deficits.           Labs:   Recent Labs     09/18/20  1852 09/19/20  0110   WBC 9.4 9.0   RBC 3.93* 3.93*   HEMOGLOBIN 11.7* 11.7*   HEMATOCRIT 35.8* 36.6*   MCV 91.1  93.1   MCH 29.8 29.8   RDW 43.2 44.7   PLATELETCT 184 193   MPV 11.3 11.9   NEUTSPOLYS 71.50 63.50   LYMPHOCYTES 20.30* 30.00   MONOCYTES 5.40 3.30   EOSINOPHILS 1.90 2.00   BASOPHILS 0.30 0.40     Recent Labs     09/18/20  1852 09/19/20  0110   SODIUM 138 137   POTASSIUM 4.4 3.5*   CHLORIDE 101 102   CO2 22 21   GLUCOSE 111* 196*   BUN 14 13   CPKTOTAL  --  121       Recent Labs     09/18/20  1852 09/19/20  0110   ALBUMIN 4.2 4.0   TBILIRUBIN 0.2 0.3   ALKPHOSPHAT 41 40   TOTPROTEIN 7.0 6.7   ALTSGPT 31 28   ASTSGOT 31 31   CREATININE 0.96 0.91       Imaging:   CT-HEAD W/O   Final Result      1.  No intracranial abnormality.   2.  Mild bilateral maxillary sinus disease.               INTERPRETING LOCATION:  [address] [place] [address], [facility], [employer]      EC-ECHOCARDIOGRAM COMPLETE W/ CONT    (Results Pending)       Assessment and Plan:  Polypharmacy- (present on admission)  Assessment & Plan  High concern for polypharmacy complicating patient's LOC event  - states that he takes pills from pillbox and puts them in container but is unsure why she is taking so many medications, many have been continued from many sources  -patient used alcohol for first time shortly before episode  -EKG shows mild prolonged QRS, no abnormal tele here    Plan:  -Discussing with patient that we will stop several of her prior home meds on discharge and taper the rest    Loss of consciousness (HCC)- (present on admission)  Assessment & Plan  Acute LOC 30s-1min with post-ictal confusion  -hx A fib, HFrEF, polypharmacy but no seizures or prior syncope  -2/2 syncope most likely cardiogenic from arrhythmia in setting of polypharmacy difficult to exclude neurogenic/seizure given poor witness accounts  -Echo without structural abnormality    Plan:  -telemetry  -holding buproprion (lowers seizure threshold)  -if seizure workup indicated, then will leave instructions for followup in [place] and suspend license    Hypothyroidism- (present on  admission)  Assessment & Plan  History of hypothyroidism, unknown etiology, as reported by patient. Ordinarily on synthroid 200mcg at home.  -Resume synthroid 200mcg    CHF (congestive heart failure) ([facility])- (present on admission)  Assessment & Plan  Previous HFrEF unknown cause  -now no evidence of heart dysfunction on BB/ACE without structural abnormality    Plan:  -continue ace, bb, statin    Atrial fibrillation ([facility])- (present on admission)  Assessment & Plan  PAF  -Chadsvasc 3    Plan:  -Coreg decreased to 25 mg BID, usual max dosing  -Warfarin, pharmacy dosing      DM2 (diabetes mellitus, type 2) ([facility])- (present on admission)  Assessment & Plan  History of DM2 controlled with metformin  -No A1C on file, but relatively well controlled    Plan:  -hold metformin for admission  -controlled, dc protocol/SSI    YANIRA (obstructive sleep apnea)- (present on admission)  Assessment & Plan  Reports history of YANIRA on CPAP with nightly compliance. Has equipment at hotel, but not at hospital.  -CPAP  -RT consult for CPAP management      Core Measures   Ambulatory- no SCD, lovenox  Full Code

## 2020-09-19 NOTE — PROGRESS NOTES
Inpatient Anticoagulation Service Note    Date: 9/19/2020    Reason for Anticoagulation: Atrial Fibrillation   Target INR: 2.0 to 3.0  [id number] VASc Score: 4  HAS-BLED Score: 0   Hemoglobin Value: (!) 11.7  Hematocrit Value: (!) 36.6  Lab Platelet Value: 193    INR from last 7 days     Date/Time INR Value    09/18/20 1852  (!) 2.47        Dose from last 7 days     Date/Time Dose (mg)         09/19/20 1317  2    09/19/20 0110  3        Average Dose (mg): (home dose: warfarin 3 mg MWF and 2 mg all other days)  Bridge Therapy: No     Comments: INR last night was therapeutic. Will continue pt's home warfarin dosing. Will check the INR tomorrow.    Education Material Provided?: No(chronic warfarin patient)  Pharmacist suggested discharge dosing: warfarin 3 mg MWF and 2 mg all other days     Justus Rodriguez, PharmD

## 2020-09-19 NOTE — PROGRESS NOTES
Assumed care of patient at bedside report from NOC RN. Updated on POC. Patient currently A & O x 4; on room air; up standby assist; wihtout complaints of acute pain. Call light within reach. Whiteboard updated. Fall precautions in place. Bed locked and in lowest position. All questions answered. No other needs indicated at this time.

## 2020-09-19 NOTE — PROGRESS NOTES
2 RN skin check complete with ARIELLE Gerard.     Devices in place: None    Wound consult placed : NO  The following interventions in place: pillows in use for repositioning,moisturizer     Bilateral ears pink/blanching  Bilateral heels dry and calloused  Otherwise, Skin is intact

## 2020-09-19 NOTE — PROGRESS NOTES
Received nursing communication to not document orthostatic BP result in flow sheet but instead chart in separate nursing note.   Orthostatics are as follows    Supine: 118/67 HR 79  Sittin/85 HR 74  Standing 2 minutes: 133/84 HR 72

## 2020-09-20 VITALS
DIASTOLIC BLOOD PRESSURE: 76 MMHG | TEMPERATURE: 97.2 F | HEART RATE: 83 BPM | SYSTOLIC BLOOD PRESSURE: 127 MMHG | HEIGHT: 67 IN | RESPIRATION RATE: 16 BRPM | WEIGHT: 239.42 LBS | OXYGEN SATURATION: 93 % | BODY MASS INDEX: 37.58 KG/M2

## 2020-09-20 LAB
GLUCOSE BLD-MCNC: 128 MG/DL (ref 65–99)
GLUCOSE BLD-MCNC: 99 MG/DL (ref 65–99)
INR PPP: 2.36 (ref 0.87–1.13)
PROTHROMBIN TIME: 26.5 SEC (ref 12–14.6)

## 2020-09-20 PROCEDURE — 700102 HCHG RX REV CODE 250 W/ 637 OVERRIDE(OP): Performed by: STUDENT IN AN ORGANIZED HEALTH CARE EDUCATION/TRAINING PROGRAM

## 2020-09-20 PROCEDURE — 36415 COLL VENOUS BLD VENIPUNCTURE: CPT

## 2020-09-20 PROCEDURE — 85610 PROTHROMBIN TIME: CPT

## 2020-09-20 PROCEDURE — 99217 PR OBSERVATION CARE DISCHARGE: CPT | Mod: GC | Performed by: HOSPITALIST

## 2020-09-20 PROCEDURE — A9270 NON-COVERED ITEM OR SERVICE: HCPCS | Performed by: STUDENT IN AN ORGANIZED HEALTH CARE EDUCATION/TRAINING PROGRAM

## 2020-09-20 PROCEDURE — G0378 HOSPITAL OBSERVATION PER HR: HCPCS

## 2020-09-20 PROCEDURE — 82962 GLUCOSE BLOOD TEST: CPT

## 2020-09-20 RX ORDER — OMEPRAZOLE 20 MG/1
20 CAPSULE, DELAYED RELEASE ORAL EVERY EVENING
Qty: 30 CAP | Refills: 0 | Status: SHIPPED | OUTPATIENT
Start: 2020-09-20

## 2020-09-20 RX ORDER — CARVEDILOL 25 MG/1
25 TABLET ORAL 2 TIMES DAILY
Qty: 60 TAB | Refills: 0 | Status: SHIPPED | OUTPATIENT
Start: 2020-09-20

## 2020-09-20 RX ORDER — LEVOTHYROXINE SODIUM 0.1 MG/1
200 TABLET ORAL EVERY MORNING
Qty: 30 TAB | Refills: 0 | Status: SHIPPED | OUTPATIENT
Start: 2020-09-20

## 2020-09-20 RX ORDER — WARFARIN SODIUM 3 MG/1
3 TABLET ORAL
Qty: 12 TAB | Refills: 0 | Status: SHIPPED | OUTPATIENT
Start: 2020-09-20

## 2020-09-20 RX ORDER — NORTRIPTYLINE HYDROCHLORIDE 75 MG/1
75 CAPSULE ORAL DAILY
Qty: 30 CAP | Refills: 0 | Status: SHIPPED | OUTPATIENT
Start: 2020-09-20

## 2020-09-20 RX ORDER — WARFARIN SODIUM 3 MG/1
3 TABLET ORAL
Status: DISCONTINUED | OUTPATIENT
Start: 2020-09-21 | End: 2020-09-20 | Stop reason: HOSPADM

## 2020-09-20 RX ORDER — WARFARIN SODIUM 2 MG/1
2 TABLET ORAL
Qty: 15 TAB | Refills: 0 | Status: SHIPPED | OUTPATIENT
Start: 2020-09-20

## 2020-09-20 RX ORDER — WARFARIN SODIUM 2 MG/1
2 TABLET ORAL
Status: DISCONTINUED | OUTPATIENT
Start: 2020-09-20 | End: 2020-09-20 | Stop reason: HOSPADM

## 2020-09-20 RX ORDER — LISINOPRIL 5 MG/1
5 TABLET ORAL 2 TIMES DAILY
Qty: 30 TAB | Refills: 0 | Status: SHIPPED | OUTPATIENT
Start: 2020-09-20 | End: 2020-09-20 | Stop reason: SDUPTHER

## 2020-09-20 RX ORDER — SIMVASTATIN 40 MG
40 TABLET ORAL EVERY EVENING
Qty: 30 TAB | Refills: 0 | Status: SHIPPED | OUTPATIENT
Start: 2020-09-20

## 2020-09-20 RX ORDER — LISINOPRIL 5 MG/1
10 TABLET ORAL 2 TIMES DAILY
Qty: 30 TAB | Refills: 0 | Status: SHIPPED | OUTPATIENT
Start: 2020-09-20

## 2020-09-20 RX ADMIN — CARVEDILOL 25 MG: 25 TABLET, FILM COATED ORAL at 04:39

## 2020-09-20 RX ADMIN — SERTRALINE HYDROCHLORIDE 50 MG: 50 TABLET ORAL at 04:39

## 2020-09-20 RX ADMIN — LISINOPRIL 5 MG: 5 TABLET ORAL at 04:39

## 2020-09-20 RX ADMIN — BUSPIRONE HYDROCHLORIDE 10 MG: 10 TABLET ORAL at 12:03

## 2020-09-20 RX ADMIN — BUSPIRONE HYDROCHLORIDE 10 MG: 10 TABLET ORAL at 04:39

## 2020-09-20 RX ADMIN — LEVOTHYROXINE SODIUM 200 MCG: 0.1 TABLET ORAL at 04:39

## 2020-09-20 NOTE — ASSESSMENT & PLAN NOTE
High concern for polypharmacy complicating patient's LOC event  - states that he takes pills from pillbox and puts them in container but is unsure why she is taking so many medications, many have been continued from many sources  -patient used alcohol for first time shortly before episode  -EKG shows mild prolonged QRS, no abnormal tele here    Plan:  -Discussing with patient that we will stop several of her prior home meds on discharge and taper the rest

## 2020-09-20 NOTE — PROGRESS NOTES
Bedside report received. Tele monitor on patient. Fall precautions in place. Patient in bed. Patient alert and oriented x4. Call light within reach. Bed in low and locked position. No further questions or medical needs at this time.

## 2020-09-20 NOTE — DISCHARGE SUMMARY
Discharge Summary    Date of Admission: 9/18/2020  Date of Discharge: 9/20/2020  Discharging Attending: Amira Jones M.D.   Discharging Senior Resident: Wisam Calvin DO    CHIEF COMPLAINT ON ADMISSION  Loss of Consciousness    Reason for Admission  Syncope due to polypharmacy    Admission Date  9/18/2020    CODE STATUS  Full Code    HPI & HOSPITAL COURSE  This is a 55 y.o. female here with loss of consciousness consistent with syncope in the setting of alcohol use and polypharmacy while sitting at rest. 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 Patient does not remember inciting factor or aura and did not display incontinence or tongue biting. Patient was admitted for concern for syncope due to arrhythmia in setting of medications and assess for seizure-like activity.    Workup including UDS, Echocardiogram and orthostatics were negative. Patient was placed on EKG with telemetry showing mild prolonged QRS with nonspecific conduction delay. Patient and  were unfamiliar with several of their medications  which can induce seizures. Patient was discharged with instructions to discontinue several of her medications as well as decrease dosage of others to prevent recurrence of syncopal event. It was unable to be determined at time of discharge if seizure-like activity was involved in patient's presentation.         Therefore, she is discharged in good and stable condition to home with close outpatient follow-up.    The patient recovered much more quickly than anticipated on admission. Patient was observation status <48 hours admitted.    PHYSICAL EXAM ON  DISCHARGE  Temp:  [36.1 °C (97 °F)-36.6 °C (97.9 °F)] 36.1 °C (97 °F)  Pulse:  [70-80] 75  Resp:  [16-18] 18  BP: (102-158)/() 136/93  SpO2:  [90 %-97 %] 94 %    Physical Exam  Vitals signs and nursing note reviewed.   Constitutional:       General: She is not in acute distress.     Appearance: She is not ill-appearing, toxic-appearing or diaphoretic.   HENT:      Head: Normocephalic and atraumatic.      Mouth/Throat:      Mouth: Mucous membranes are moist.      Pharynx: Oropharynx is clear.   Eyes:      Extraocular Movements: Extraocular movements intact.      Pupils: Pupils are equal, round, and reactive to light.   Neck:      Musculoskeletal: Normal range of motion.   Cardiovascular:      Rate and Rhythm: Normal rate and regular rhythm.      Heart sounds: No murmur. No friction rub. No gallop.    Pulmonary:      Effort: Pulmonary effort is normal. No respiratory distress.      Breath sounds: Normal breath sounds. No wheezing or rales.   Abdominal:      General: There is no distension.      Palpations: Abdomen is soft.      Tenderness: There is no abdominal tenderness. There is no guarding or rebound.   Musculoskeletal: Normal range of motion.      Comments: Shoulder, elbow flexion/extension 5/5 bilaterally. No limits to ROM of hip, knee. Hip flexion 5/5 bilaterally. Dorsi/plantarflexion 5/5 bilaterally.   Skin:     General: Skin is warm and dry.   Neurological:      Mental Status: She is alert.      Cranial Nerves: Cranial nerves are intact.      Motor: Motor function is intact.      Comments: CN 2-12 intact w/o deficit or weakness. Sensation to pressure, touch, pinprick, and proprioception intact bilaterally in upper and lower extremities. No coordination deficit. Muscle strengths intact and as noted in MSK. Normal gait. No vertical/horizontal nystagmus. Memory deficit, but no obvious confusion--interacts and responds purposefully and directly, without tangential thoughts. No comprehension/speaking  deficits.         Discharge Date  9/20/2020    FOLLOW UP ITEMS POST DISCHARGE  Polypharmacy- Patient taken off wellbutrin at lowest dose without taper, buspar, armodafinil, with decreased dose of nortriptyline with instructions to follow with her PCP to increase the doses as needed for her symptoms, anxiety, and depression.    Potential seizure- unable to determine from hx if seizure present, potential due to patient's medications. As first time event provoked by medications, did not get EEG. Will defer discussion regarding need for neurology follow up to primary care physician for EEG/MRI as needed.    DISCHARGE DIAGNOSES  Active Problems:    Loss of consciousness (HCC) POA: Yes    Polypharmacy POA: Yes    Atrial fibrillation (HCC) POA: Yes    CHF (congestive heart failure) (HCC) POA: Yes    Hypothyroidism POA: Yes    YANIRA (obstructive sleep apnea) POA: Yes    DM2 (diabetes mellitus, type 2) (Former[facility]) POA: Yes  Resolved Problems:    * No resolved hospital problems. *      FOLLOW UP  No future appointments.  PCP in [place]    Schedule an appointment as soon as possible for a visit in 1 week  discuss medications      MEDICATIONS ON DISCHARGE     Medication List      CHANGE how you take these medications      Instructions   carvedilol 25 MG Tabs  What changed: how much to take  Commonly known as: COREG   Take 1 Tab by mouth 2 Times a Day.  Dose: 25 mg     lisinopril 5 MG Tabs  What changed: how much to take  Commonly known as: PRINIVIL   Take 2 Tabs by mouth 2 Times a Day.  Dose: 10 mg     nortriptyline 75 MG capsule  What changed:   · how much to take  · when to take this  Commonly known as: PAMELOR   Take 1 Cap by mouth every day.  Dose: 75 mg     simvastatin 40 MG Tabs  What changed: when to take this  Commonly known as: ZOCOR   Take 1 Tab by mouth every evening.  Dose: 40 mg        CONTINUE taking these medications      Instructions   levothyroxine 100 MCG Tabs  Commonly known as: SYNTHROID   Take 2 Tabs by mouth  every morning.  Dose: 200 mcg     metFORMIN 500 MG Tabs  Commonly known as: GLUCOPHAGE   Take 1 Tab by mouth 3 times a day.  Dose: 500 mg     omeprazole 20 MG delayed-release capsule  Commonly known as: PRILOSEC   Take 1 Cap by mouth every evening.  Dose: 20 mg     sertraline 50 MG Tabs  Commonly known as: ZOLOFT   Take 1 Tab by mouth every morning.  Dose: 50 mg     * warfarin 2 MG Tabs  Commonly known as: COUMADIN   Take 1 Tab by mouth every 48 hours. Tuesday, Thursday, Saturday & Sunday  Dose: 2 mg     * warfarin 3 MG Tabs  Commonly known as: COUMADIN   Take 1 Tab by mouth 3 times a week. Monday, Wednesday, & Friday  Dose: 3 mg         * This list has 2 medication(s) that are the same as other medications prescribed for you. Read the directions carefully, and ask your doctor or other care provider to review them with you.            STOP taking these medications    acetaminophen/caffeine/butalbital 325-40-50 mg -40 MG Tabs  Commonly known as: FIORICET     Armodafinil 150 MG Tabs     buPROPion  MG Tb12  Commonly known as: WELLBUTRIN-SR     busPIRone 10 MG Tabs tablet  Commonly known as: BUSPAR     CALCIUM PO     HYDROcodone-acetaminophen 5-325 MG Tabs per tablet  Commonly known as: NORCO     MAGNESIUM PO     ondansetron 8 MG Tbdp  Commonly known as: ZOFRAN ODT     oxyCODONE immediate-release 5 MG Tabs  Commonly known as: ROXICODONE     SUMAtriptan 50 MG Tabs  Commonly known as: IMITREX            Allergies  No Known Allergies    DIET  Orders Placed This Encounter   Procedures   • Diet Order Diabetic     Standing Status:   Standing     Number of Occurrences:   1     Order Specific Question:   Diet:     Answer:   Diabetic [3]       ACTIVITY  As tolerated.  Weight bearing as tolerated    CONSULTATIONS  N/A    PROCEDURES  N/A    Time spent discharging patient: 45 minutes

## 2020-09-20 NOTE — PROGRESS NOTES
Bedside report received from night RN. Pt sleeping. No distress noted on room air. Bed in low position. Call light and belongings within reach

## 2020-09-20 NOTE — PROGRESS NOTES
Pt discharging today.  Med rec completed. Scripts sent to pharmacy. INR, PCP, cardiology appt to be made by pt. HF and coumadin teaching provided. PIV and tele box removed. Pt and  verbalized understanding of discharge instructions. Pt refused escort. Ambulatory with  to private vehicle.

## 2020-09-20 NOTE — DISCHARGE INSTRUCTIONS
Discharge Instructions    Discharged to home by car with relative. Discharged via wheelchair, hospital escort: Yes.  Special equipment needed: Not Applicable    Be sure to schedule a follow-up appointment with your primary care doctor or any specialists as instructed.     Discharge Plan:   Diet Plan: Discussed  Activity Level: Discussed  Confirmed Follow up Appointment: Appointment Scheduled  Confirmed Symptoms Management: Discussed  Medication Reconciliation Updated: Yes    I understand that a diet low in cholesterol, fat, and sodium is recommended for good health. Unless I have been given specific instructions below for another diet, I accept this instruction as my diet prescription.   Other diet: low sodium, diabetic     Special Instructions:   HF Patient Discharge Instructions  · Monitor your weight daily, and maintain a weight chart, to track your weight changes.   · Activity as tolerated, unless your Doctor has ordered otherwise. Other activity order: none.  · Follow a low fat, low cholesterol, low salt diet unless instructed otherwise by your Doctor. Read the labels on the back of food products and track your intake of fat, cholesterol and salt.   · Fluid Restriction No. If a Fluid Restriction has been ordered by your Doctor, measure fluids with a measuring cup to ensure that you are not exceeding the restriction.   · No smoking.  · Oxygen No. If your Doctor has ordered that you wear Oxygen at home, it is important to wear it as ordered.  · Did you receive an explanation from staff on the importance of taking each of your medications and why it is necessary to keep taking them unless your doctor says to stop? Yes  · Were all of your questions answered about how to manage your heart failure and what to do if you have increased signs and symptoms after you go home? Yes  · Do you feel like your heart failure care team involved you in the care treatment plan and allowed you to make decisions regarding your care  while in the hospital and addressed any discharge needs you might have? Yes    See the educational handout provided at discharge for more information on monitoring your daily weight, activity and diet. This also explains more about Heart Failure, symptoms of a flare-up and some of the tests that you have undergone.     Warning Signs of a Flare-Up include:  · Swelling in the ankles or lower legs.  · Shortness of breath, while at rest, or while doing normal activities.   · Shortness of breath at night when in bed, or coughing in bed.   · Requiring more pillows to sleep at night, or needing to sit up at night to sleep.  · Feeling weak, dizzy or fatigued.     When to call your Doctor:  · Call [facility] seven days a week from 8:00 a.m. to 8:00 p.m. for medical questions [phone].  · Call your Primary Care Physician or Cardiologist if:   1. You experience any pain radiating to your jaw or neck.  2. You have any difficulty breathing.  3. You experience weight gain of 3 lbs in a day or 5 lbs in a week.   4. You feel any palpitations or irregular heartbeats.  5. You become dizzy or lose consciousness.   If you have had an angiogram or had a pacemaker or AICD placed, and experience:  1. Bleeding, drainage or swelling at the surgical / puncture site.  2. Fever greater than 100.0 F  3. Shock from internal defibrillator.  4. Cool and / or numb extremities.      · Is patient discharged on Warfarin / Coumadin?   Yes    You are receiving the drug warfarin. Please understand the importance of monitoring warfarin with scheduled PT/INR blood draws.  Follow-up with a call to your personal Doctor's office in 3 days to schedule a PT/INR. .    IMPORTANT: HOW TO USE THIS INFORMATION:  This is a summary and does NOT have all possible information about this product. This information does not assure that this product is safe, effective, or appropriate for you. This information is not individual medical advice and does not  "substitute for the advice of your health care professional. Always ask your health care professional for complete information about this product and your specific health needs.      WARFARIN - ORAL (WARF-uh-rin)      COMMON BRAND NAME(S): Coumadin      WARNING:  Warfarin can cause very serious (possibly fatal) bleeding. This is more likely to occur when you first start taking this medication or if you take too much warfarin. To decrease your risk for bleeding, your doctor or other health care provider will monitor you closely and check your lab results (INR test) to make sure you are not taking too much warfarin. Keep all medical and laboratory appointments. Tell your doctor right away if you notice any signs of serious bleeding. See also Side Effects section.      USES:  This medication is used to treat blood clots (such as in deep vein thrombosis-DVT or pulmonary embolus-PE) and/or to prevent new clots from forming in your body. Preventing harmful blood clots helps to reduce the risk of a stroke or heart attack. Conditions that increase your risk of developing blood clots include a certain type of irregular heart rhythm (atrial fibrillation), heart valve replacement, recent heart attack, and certain surgeries (such as hip/knee replacement). Warfarin is commonly called a \"blood thinner,\" but the more correct term is \"anticoagulant.\" It helps to keep blood flowing smoothly in your body by decreasing the amount of certain substances (clotting proteins) in your blood.      HOW TO USE:  Read the Medication Guide provided by your pharmacist before you start taking warfarin and each time you get a refill. If you have any questions, ask your doctor or pharmacist. Take this medication by mouth with or without food as directed by your doctor or other health care professional, usually once a day. It is very important to take it exactly as directed. Do not increase the dose, take it more frequently, or stop using it unless " directed by your doctor. Dosage is based on your medical condition, laboratory tests (such as INR), and response to treatment. Your doctor or other health care provider will monitor you closely while you are taking this medication to determine the right dose for you. Use this medication regularly to get the most benefit from it. To help you remember, take it at the same time each day. It is important to eat a balanced, consistent diet while taking warfarin. Some foods can affect how warfarin works in your body and may affect your treatment and dose. Avoid sudden large increases or decreases in your intake of foods high in vitamin K (such as broccoli, cauliflower, cabbage, brussels sprouts, kale, spinach, and other green leafy vegetables, liver, green tea, certain vitamin supplements). If you are trying to lose weight, check with your doctor before you try to go on a diet. Cranberry products may also affect how your warfarin works. Limit the amount of cranberry juice (16 ounces/480 milliliters a day) or other cranberry products you may drink or eat.      SIDE EFFECTS:  Nausea, loss of appetite, or stomach/abdominal pain may occur. If any of these effects persist or worsen, tell your doctor or pharmacist promptly. Remember that your doctor has prescribed this medication because he or she has judged that the benefit to you is greater than the risk of side effects. Many people using this medication do not have serious side effects. This medication can cause serious bleeding if it affects your blood clotting proteins too much (shown by unusually high INR lab results). Even if your doctor stops your medication, this risk of bleeding can continue for up to a week. Tell your doctor right away if you have any signs of serious bleeding, including: unusual pain/swelling/discomfort, unusual/easy bruising, prolonged bleeding from cuts or gums, persistent/frequent nosebleeds, unusually heavy/prolonged menstrual flow, pink/dark  urine, coughing up blood, vomit that is bloody or looks like coffee grounds, severe headache, dizziness/fainting, unusual or persistent tiredness/weakness, bloody/black/tarry stools, chest pain, shortness of breath, difficulty swallowing. Tell your doctor right away if any of these unlikely but serious side effects occur: persistent nausea/vomiting, severe stomach/abdominal pain, yellowing eyes/skin. This drug rarely has caused very serious (possibly fatal) problems if its effects lead to small blood clots (usually at the beginning of treatment). This can lead to severe skin/tissue damage that may require surgery or amputation if left untreated. Patients with certain blood conditions (protein C or S deficiency) may be at greater risk. Get medical help right away if any of these rare but serious side effects occur: painful/red/purplish patches on the skin (such as on the toe, breast, abdomen), change in the amount of urine, vision changes, confusion, slurred speech, weakness on one side of the body. A very serious allergic reaction to this drug is rare. However, get medical help right away if you notice any symptoms of a serious allergic reaction, including: rash, itching/swelling (especially of the face/tongue/throat), severe dizziness, trouble breathing. This is not a complete list of possible side effects. If you notice other effects not listed above, contact your doctor or pharmacist. In the [place] - Call your doctor for medical advice about side effects. You may report side effects to FDA at 4-612-JCU-8592. In [place] - Call your doctor for medical advice about side effects. You may report side effects to [facility] [place] at 1-243.348.8204.      PRECAUTIONS:  Before taking warfarin, tell your doctor or pharmacist if you are allergic to it; or if you have any other allergies. This product may contain inactive ingredients, which can cause allergic reactions or other problems. Talk to your pharmacist for more details.  Before using this medication, tell your doctor or pharmacist your medical history, especially of: blood disorders (such as anemia, hemophilia), bleeding problems (such as bleeding of the stomach/intestines, bleeding in the brain), blood vessel disorders (such as aneurysms), recent major injury/surgery, liver disease, alcohol use, mental/mood disorders (including memory problems), frequent falls/injuries. It is important that all your doctors and dentists know that you take warfarin. Before having surgery or any medical/dental procedures, tell your doctor or dentist that you are taking this medication and about all the products you use (including prescription drugs, nonprescription drugs, and herbal products). Avoid getting injections into the muscles. If you must have an injection into a muscle (for example, a flu shot), it should be given in the arm. This way, it will be easier to check for bleeding and/or apply pressure bandages. This medication may cause stomach bleeding. Daily use of alcohol while using this medicine will increase your risk for stomach bleeding and may also affect how this medication works. Limit or avoid alcoholic beverages. If you have not been eating well, if you have an illness or infection that causes fever, vomiting, or diarrhea for more than 2 days, or if you start using any antibiotic medications, contact your doctor or pharmacist immediately because these conditions can affect how warfarin works. This medication can cause heavy bleeding. To lower the chance of getting cut, bruised, or injured, use great caution with sharp objects like safety razors and nail cutters. Use an electric razor when shaving and a soft toothbrush when brushing your teeth. Avoid activities such as contact sports. If you fall or injure yourself, especially if you hit your head, call your doctor immediately. Your doctor may need to check you. The Food & Drug Administration has stated that generic warfarin products  "are interchangeable. However, consult your doctor or pharmacist before switching warfarin products. Be careful not to take more than one medication that contains warfarin unless specifically directed by the doctor or health care provider who is monitoring your warfarin treatment. Older adults may be at greater risk for bleeding while using this drug. This medication is not recommended for use during pregnancy because of serious (possibly fatal) harm to an unborn baby. Discuss the use of reliable forms of birth control with your doctor. If you become pregnant or think you may be pregnant, tell your doctor immediately. If you are planning pregnancy, discuss a plan for managing your condition with your doctor before you become pregnant. Your doctor may switch the type of medication you use during pregnancy. Very small amounts of this medication may pass into breast milk but is unlikely to harm a nursing infant. Consult your doctor before breast-feeding.      DRUG INTERACTIONS:  Drug interactions may change how your medications work or increase your risk for serious side effects. This document does not contain all possible drug interactions. Keep a list of all the products you use (including prescription/nonprescription drugs and herbal products) and share it with your doctor and pharmacist. Do not start, stop, or change the dosage of any medicines without your doctor's approval. Warfarin interacts with many prescription, nonprescription, vitamin, and herbal products. This includes medications that are applied to the skin or inside the vagina or rectum. The interactions with warfarin usually result in an increase or decrease in the \"blood-thinning\" (anticoagulant) effect. Your doctor or other health care professional should closely monitor you to prevent serious bleeding or clotting problems. While taking warfarin, it is very important to tell your doctor or pharmacist of any changes in medications, vitamins, or herbal " products that you are taking. Some products that may interact with this drug include: capecitabine, imatinib, mifepristone. Aspirin, aspirin-like drugs (salicylates), and nonsteroidal anti-inflammatory drugs (NSAIDs such as ibuprofen, naproxen, celecoxib) may have effects similar to warfarin. These drugs may increase the risk of bleeding problems if taken during treatment with warfarin. Carefully check all prescription/nonprescription product labels (including drugs applied to the skin such as pain-relieving creams) since the products may contain NSAIDs or salicylates. Talk to your doctor about using a different medication (such as acetaminophen) to treat pain/fever. Low-dose aspirin and related drugs (such as clopidogrel, ticlopidine) should be continued if prescribed by your doctor for specific medical reasons such as heart attack or stroke prevention. Consult your doctor or pharmacist for more details. Many herbal products interact with warfarin. Tell your doctor before taking any herbal products, especially bromelains, coenzyme Q10, cranberry, danshen, dong quai, fenugreek, garlic, ginkgo biloba, ginseng, and Radha's wort, among others. This medication may interfere with a certain laboratory test to measure theophylline levels, possibly causing false test results. Make sure laboratory personnel and all your doctors know you use this drug.      OVERDOSE:  If overdose is suspected, contact a poison control center or emergency room immediately. [place] residents can call the [place] National Poison Hotline at 1-863.770.3810. [place] residents can call a provincial poison control center. Symptoms of overdose may include: bloody/black/tarry stools, pink/dark urine, unusual/prolonged bleeding.      NOTES:  Do not share this medication with others. Laboratory and/or medical tests (such as INR, complete blood count) must be performed periodically to monitor your progress or check for side effects. Consult your doctor for more  details.      MISSED DOSE:  For the best possible benefit, do not miss any doses. If you do miss a dose and remember on the same day, take it as soon as you remember. If you remember on the next day, skip the missed dose and resume your usual dosing schedule. Do not double the dose to catch up because this could increase your risk for bleeding. Keep a record of missed doses to give to your doctor or pharmacist. Contact your doctor or pharmacist if you miss 2 or more doses in a row.      STORAGE:  Store at room temperature away from light and moisture. Do not store in the bathroom. Keep all medications away from children and pets. Do not flush medications down the toilet or pour them into a drain unless instructed to do so. Properly discard this product when it is  or no longer needed. Consult your pharmacist or local waste disposal company for more details about how to safely discard your product.      MEDICAL ALERT:  Your condition and medication can cause complications in a medical emergency. For information about enrolling in MedicAlert, call 1-442.748.8367 ([place]) or 1-690.333.4240 ([place]).      Information last revised 2010 Copyright(c) 2010 [employer]             Depression / Suicide Risk    As you are discharged from this [facility] facility, it is important to learn how to keep safe from harming yourself.    Recognize the warning signs:  · Abrupt changes in personality, positive or negative- including increase in energy   · Giving away possessions  · Change in eating patterns- significant weight changes-  positive or negative  · Change in sleeping patterns- unable to sleep or sleeping all the time   · Unwillingness or inability to communicate  · Depression  · Unusual sadness, discouragement and loneliness  · Talk of wanting to die  · Neglect of personal appearance   · Rebelliousness- reckless behavior  · Withdrawal from people/activities they love  · Confusion- inability to  concentrate     If you or a loved one observes any of these behaviors or has concerns about self-harm, here's what you can do:  · Talk about it- your feelings and reasons for harming yourself  · Remove any means that you might use to hurt yourself (examples: pills, rope, extension cords, firearm)  · Get professional help from the community (Mental Health, Substance Abuse, psychological counseling)  · Do not be alone:Call your Safe Contact- someone whom you trust who will be there for you.  · Call your local CRISIS HOTLINE 491-5269 or 189-917-4200  · Call your local Children's Mobile Crisis Response Team [place] (541) 200-8554 or www.[employer]  · Call the toll free National Suicide Prevention Hotlines   · National Suicide Prevention Lifeline 431-317-KEBB (4980)  · [employer] Line Network 800-SUICIDE (594-5205)        Syncope  Syncope is when you pass out (faint) for a short time. It is caused by a sudden decrease in blood flow to the brain. Signs that you may be about to pass out include:  · Feeling dizzy or light-headed.  · Feeling sick to your stomach (nauseous).  · Seeing all white or all black.  · Having cold, clammy skin.  If you pass out, get help right away. Call your local emergency services (911 in the [place] Do not drive yourself to the hospital.  Follow these instructions at home:  Watch for any changes in your symptoms. Take these actions to stay safe and help with your symptoms:  Lifestyle  · Do not drive, use machinery, or play sports until your doctor says it is okay.  · Do not drink alcohol.  · Do not use any products that contain nicotine or tobacco, such as cigarettes and e-cigarettes. If you need help quitting, ask your doctor.  · Drink enough fluid to keep your pee (urine) pale yellow.  General instructions  · Take over-the-counter and prescription medicines only as told by your doctor.  · If you are taking blood pressure or heart medicine, sit up and stand up slowly. Spend a few  minutes getting ready to sit and then stand. This can help you feel less dizzy.  · Have someone stay with you until you feel stable.  · If you start to feel like you might pass out, lie down right away and raise (elevate) your feet above the level of your heart. Breathe deeply and steadily. Wait until all of the symptoms are gone.  · Keep all follow-up visits as told by your doctor. This is important.  Get help right away if:  · You have a very bad headache.  · You pass out once or more than once.  · You have pain in your chest, belly, or back.  · You have a very fast or uneven heartbeat (palpitations).  · It hurts to breathe.  · You are bleeding from your mouth or your bottom (rectum).  · You have black or tarry poop (stool).  · You have jerky movements that you cannot control (seizure).  · You are confused.  · You have trouble walking.  · You are very weak.  · You have vision problems.  These symptoms may be an emergency. Do not wait to see if the symptoms will go away. Get medical help right away. Call your local emergency services (911 in the [place] Do not drive yourself to the hospital.  Summary  · Syncope is when you pass out (faint) for a short time. It is caused by a sudden decrease in blood flow to the brain.  · Signs that you may be about to faint include feeling dizzy, light-headed, or sick to your stomach, seeing all white or all black, or having cold, clammy skin.  · If you start to feel like you might pass out, lie down right away and raise (elevate) your feet above the level of your heart. Breathe deeply and steadily. Wait until all of the symptoms are gone.  This information is not intended to replace advice given to you by your health care provider. Make sure you discuss any questions you have with your health care provider.  Document Released: 06/05/2009 Document Revised: 01/30/2019 Document Reviewed: 01/30/2019  Elsevier Patient Education © 2020 "[employer]    Seizure, Adult  A seizure is a  sudden burst of abnormal electrical activity in the brain. Seizures usually last from 30 seconds to 2 minutes. They can cause many different symptoms.  Usually, seizures are not harmful unless they last a long time.  What are the causes?  Common causes of this condition include:  · Fever or infection.  · Conditions that affect the brain, such as:  ? A brain abnormality that you were born with.  ? A brain or head injury.  ? Bleeding in the brain.  ? A tumor.  ? Stroke.  ? Brain disorders such as autism or cerebral palsy.  · Low blood sugar.  · Conditions that are passed from parent to child (are inherited).  · Problems with substances, such as:  ? Having a reaction to a drug or a medicine.  ? Suddenly stopping the use of a substance (withdrawal).  In some cases, the cause may not be known. A person who has repeated seizures over time without a clear cause has a condition called epilepsy.  What increases the risk?  You are more likely to get this condition if you have:  · A family history of epilepsy.  · Had a seizure in the past.  · A brain disorder.  · A history of head injury, lack of oxygen at birth, or strokes.  What are the signs or symptoms?  There are many types of seizures. The symptoms vary depending on the type of seizure you have. Examples of symptoms during a seizure include:  · Shaking (convulsions).  · Stiffness in the body.  · Passing out (losing consciousness).  · Head nodding.  · Staring.  · Not responding to sound or touch.  · Loss of bladder control and bowel control.  Some people have symptoms right before and right after a seizure happens.  Symptoms before a seizure may include:  · Fear.  · Worry (anxiety).  · Feeling like you may vomit (nauseous).  · Feeling like the room is spinning (vertigo).  · Feeling like you saw or heard something before (déjà vu).  · Odd tastes or smells.  · Changes in how you see. You may see flashing lights or spots.  Symptoms after a seizure happens can  include:  · Confusion.  · Sleepiness.  · Headache.  · Weakness on one side of the body.  How is this treated?  Most seizures will stop on their own in under 5 minutes. In these cases, no treatment is needed. Seizures that last longer than 5 minutes will usually need treatment. Treatment can include:  · Medicines given through an IV tube.  · Avoiding things that are known to cause your seizures. These can include medicines that you take for another condition.  · Medicines to treat epilepsy.  · Surgery to stop the seizures. This may be needed if medicines do not help.  Follow these instructions at home:  Medicines  · Take over-the-counter and prescription medicines only as told by your doctor.  · Do not eat or drink anything that may keep your medicine from working, such as alcohol.  Activity  · Do not do any activities that would be dangerous if you had another seizure, like driving or swimming. Wait until your doctor says it is safe for you to do them.  · If you live in the [place] ask your local DMV (department of [employer]) when you can drive.  · Get plenty of rest.  Teaching others  Teach friends and family what to do when you have a seizure. They should:  · Lay you on the ground.  · Protect your head and body.  · Loosen any tight clothing around your neck.  · Turn you on your side.  · Not hold you down.  · Not put anything into your mouth.  · Know whether or not you need emergency care.  · Stay with you until you are better.    General instructions  · Contact your doctor each time you have a seizure.  · Avoid anything that gives you seizures.  · Keep a seizure diary. Write down:  ? What you think caused each seizure.  ? What you remember about each seizure.  · Keep all follow-up visits as told by your doctor. This is important.  Contact a doctor if:  · You have another seizure.  · You have seizures more often.  · There is any change in what happens during your seizures.  · You keep having seizures with  treatment.  · You have symptoms of being sick or having an infection.  Get help right away if:  · You have a seizure that:  ? Lasts longer than 5 minutes.  ? Is different than seizures you had before.  ? Makes it harder to breathe.  ? Happens after you hurt your head.  · You have any of these symptoms after a seizure:  ? Not being able to speak.  ? Not being able to use a part of your body.  ? Confusion.  ? A bad headache.  · You have two or more seizures in a row.  · You do not wake up right after a seizure.  · You get hurt during a seizure.  These symptoms may be an emergency. Do not wait to see if the symptoms will go away. Get medical help right away. Call your local emergency services (911 in the [place] Do not drive yourself to the hospital.  Summary  · Seizures usually last from 30 seconds to 2 minutes. Usually, they are not harmful unless they last a long time.  · Do not eat or drink anything that may keep your medicine from working, such as alcohol.  · Teach friends and family what to do when you have a seizure.  · Contact your doctor each time you have a seizure.  This information is not intended to replace advice given to you by your health care provider. Make sure you discuss any questions you have with your health care provider.  Document Released: 06/05/2009 Document Revised: 03/06/2020 Document Reviewed: 03/06/2020  [employer]vier Patient Education © 2020 [employer]

## 2021-12-26 NOTE — CARE PLAN
Problem: Communication  Goal: The ability to communicate needs accurately and effectively will improve  Outcome: PROGRESSING AS EXPECTED     Problem: Safety  Goal: Will remain free from injury  Outcome: PROGRESSING AS EXPECTED     Problem: Knowledge Deficit  Goal: Knowledge of disease process/condition, treatment plan, diagnostic tests, and medications will improve  Outcome: PROGRESSING AS EXPECTED     
  Problem: Communication  Goal: The ability to communicate needs accurately and effectively will improve  Outcome: PROGRESSING AS EXPECTED     Problem: Safety  Goal: Will remain free from injury  Outcome: PROGRESSING AS EXPECTED  Goal: Will remain free from falls  Outcome: PROGRESSING AS EXPECTED     
  Problem: Communication  Goal: The ability to communicate needs accurately and effectively will improve  Outcome: PROGRESSING AS EXPECTED     Problem: Safety  Goal: Will remain free from injury  Outcome: PROGRESSING AS EXPECTED  Goal: Will remain free from falls  Outcome: PROGRESSING AS EXPECTED     
  Problem: Communication  Goal: The ability to communicate needs accurately and effectively will improve  Outcome: PROGRESSING AS EXPECTED     Problem: Safety  Goal: Will remain free from injury  Outcome: PROGRESSING AS EXPECTED  Goal: Will remain free from falls  Outcome: PROGRESSING AS EXPECTED     Problem: Infection  Goal: Will remain free from infection  Outcome: PROGRESSING AS EXPECTED     Problem: Venous Thromboembolism (VTW)/Deep Vein Thrombosis (DVT) Prevention:  Goal: Patient will participate in Venous Thrombosis (VTE)/Deep Vein Thrombosis (DVT)Prevention Measures  Outcome: PROGRESSING AS EXPECTED     Problem: Bowel/Gastric:  Goal: Normal bowel function is maintained or improved  Outcome: PROGRESSING AS EXPECTED  Goal: Will not experience complications related to bowel motility  Outcome: PROGRESSING AS EXPECTED     Problem: Knowledge Deficit  Goal: Knowledge of disease process/condition, treatment plan, diagnostic tests, and medications will improve  Outcome: PROGRESSING AS EXPECTED  Goal: Knowledge of the prescribed therapeutic regimen will improve  Outcome: PROGRESSING AS EXPECTED     Problem: Discharge Barriers/Planning  Goal: Patient's continuum of care needs will be met  Outcome: PROGRESSING AS EXPECTED     Problem: Respiratory:  Goal: Respiratory status will improve  Outcome: PROGRESSING AS EXPECTED     
You may use OTC Debrox, 5-10 drops to right ear twice per day for a total of 4 days.  Follow-up if no improvement.    Discussed Shingrex vaccine, please inquire at pharmacy for coverage and administration.        Preventive Health Recommendations  Female Ages 50 - 64    Yearly exam: See your health care provider every year in order to  o Review health changes.   o Discuss preventive care.    o Review your medicines if your doctor has prescribed any.      Get a Pap test every three years (unless you have an abnormal result and your provider advises testing more often).    If you get Pap tests with HPV test, you only need to test every 5 years, unless you have an abnormal result.     You do not need a Pap test if your uterus was removed (hysterectomy) and you have not had cancer.    You should be tested each year for STDs (sexually transmitted diseases) if you're at risk.     Have a mammogram every 1 to 2 years.    Have a colonoscopy at age 50, or have a yearly FIT test (stool test). These exams screen for colon cancer.      Have a cholesterol test every 5 years, or more often if advised.    Have a diabetes test (fasting glucose) every three years. If you are at risk for diabetes, you should have this test more often.     If you are at risk for osteoporosis (brittle bone disease), think about having a bone density scan (DEXA).    Shots: Get a flu shot each year. Get a tetanus shot every 10 years.    Nutrition:     Eat at least 5 servings of fruits and vegetables each day.    Eat whole-grain bread, whole-wheat pasta and brown rice instead of white grains and rice.    Get adequate Calcium and Vitamin D.     Lifestyle    Exercise at least 150 minutes a week (30 minutes a day, 5 days a week). This will help you control your weight and prevent disease.    Limit alcohol to one drink per day.    No smoking.     Wear sunscreen to prevent skin cancer.     See your dentist every six months for an exam and cleaning.    See your 
eye doctor every 1 to 2 years.    
ABSCESS